# Patient Record
Sex: FEMALE | Race: ASIAN | Employment: FULL TIME | URBAN - METROPOLITAN AREA
[De-identification: names, ages, dates, MRNs, and addresses within clinical notes are randomized per-mention and may not be internally consistent; named-entity substitution may affect disease eponyms.]

---

## 2017-05-31 ENCOUNTER — HOSPITAL ENCOUNTER (EMERGENCY)
Facility: HOSPITAL | Age: 27
Discharge: HOME | End: 2017-05-31
Attending: EMERGENCY MEDICINE
Payer: COMMERCIAL

## 2017-05-31 VITALS
HEART RATE: 66 BPM | DIASTOLIC BLOOD PRESSURE: 82 MMHG | BODY MASS INDEX: 26.68 KG/M2 | HEIGHT: 62 IN | TEMPERATURE: 98 F | OXYGEN SATURATION: 99 % | RESPIRATION RATE: 16 BRPM | WEIGHT: 145 LBS | SYSTOLIC BLOOD PRESSURE: 121 MMHG

## 2017-05-31 DIAGNOSIS — K59.00 CONSTIPATION, UNSPECIFIED CONSTIPATION TYPE: Primary | ICD-10-CM

## 2017-05-31 DIAGNOSIS — R10.9 ABDOMINAL PAIN OF UNKNOWN ETIOLOGY: ICD-10-CM

## 2017-05-31 DIAGNOSIS — N39.0 URINARY TRACT INFECTION WITHOUT HEMATURIA, SITE UNSPECIFIED: ICD-10-CM

## 2017-05-31 PROCEDURE — 99283 EMERGENCY DEPT VISIT LOW MDM: CPT

## 2017-05-31 PROCEDURE — 87088 URINE BACTERIA CULTURE: CPT | Performed by: EMERGENCY MEDICINE

## 2017-05-31 PROCEDURE — 81001 URINALYSIS AUTO W/SCOPE: CPT | Performed by: EMERGENCY MEDICINE

## 2017-05-31 PROCEDURE — 87086 URINE CULTURE/COLONY COUNT: CPT | Performed by: EMERGENCY MEDICINE

## 2017-05-31 PROCEDURE — 99285 EMERGENCY DEPT VISIT HI MDM: CPT

## 2017-05-31 PROCEDURE — 87186 SC STD MICRODIL/AGAR DIL: CPT | Performed by: EMERGENCY MEDICINE

## 2017-05-31 PROCEDURE — 81025 URINE PREGNANCY TEST: CPT

## 2017-05-31 RX ORDER — POLYETHYLENE GLYCOL 3350 17 G/17G
17 POWDER, FOR SOLUTION ORAL DAILY PRN
Qty: 30 EACH | Refills: 0 | Status: SHIPPED | OUTPATIENT
Start: 2017-05-31 | End: 2017-06-07

## 2017-05-31 RX ORDER — MAGNESIUM CARB/ALUMINUM HYDROX 105-160MG
296 TABLET,CHEWABLE ORAL ONCE
Status: COMPLETED | OUTPATIENT
Start: 2017-05-31 | End: 2017-05-31

## 2017-05-31 RX ORDER — SULFAMETHOXAZOLE AND TRIMETHOPRIM 800; 160 MG/1; MG/1
1 TABLET ORAL 2 TIMES DAILY
Qty: 10 TABLET | Refills: 0 | Status: SHIPPED | OUTPATIENT
Start: 2017-05-31 | End: 2017-06-05

## 2017-06-01 NOTE — ED PROVIDER NOTES
Patient Seen in: [facility] Emergency Department    History   Patient presents with:  Constipation (gastrointestinal)    Stated Complaint: UTI    HPI  This is a 24-year-old female who presents with complaints of constipation for last 5 days.   She ha distress. The patient is in no respiratory distress. The patient is not septic or toxic    HEENT: Atraumatic, conjunctiva are not pale. There is no icterus. Oral mucosa Is wet. No facial trauma. The neck is supple.     LUNGS: Clear to auscultation, the The patient feels comfortable going home I reexamined her abdomen soft and nontender without rebound, guarding    I discussed with the patient that were seeing them  in a short period of time.   I discussed with them that there is always a possibility that

## 2018-08-03 ENCOUNTER — HOSPITAL ENCOUNTER (OUTPATIENT)
Dept: GENERAL RADIOLOGY | Facility: HOSPITAL | Age: 28
Discharge: HOME | End: 2018-08-03
Attending: SPECIALIST
Payer: COMMERCIAL

## 2018-08-03 DIAGNOSIS — R76.11 POSITIVE PPD: ICD-10-CM

## 2018-08-03 PROCEDURE — 71046 X-RAY EXAM CHEST 2 VIEWS: CPT | Performed by: SPECIALIST

## 2020-06-04 ENCOUNTER — HOSPITAL ENCOUNTER (EMERGENCY)
Facility: HOSPITAL | Age: 30
Discharge: HOME | End: 2020-06-04
Attending: EMERGENCY MEDICINE
Payer: COMMERCIAL

## 2020-06-04 VITALS
RESPIRATION RATE: 16 BRPM | TEMPERATURE: 101 F | BODY MASS INDEX: 26.5 KG/M2 | SYSTOLIC BLOOD PRESSURE: 122 MMHG | OXYGEN SATURATION: 100 % | HEART RATE: 88 BPM | WEIGHT: 144 LBS | DIASTOLIC BLOOD PRESSURE: 76 MMHG | HEIGHT: 62 IN

## 2020-06-04 DIAGNOSIS — H60.502 ACUTE OTITIS EXTERNA OF LEFT EAR, UNSPECIFIED TYPE: Primary | ICD-10-CM

## 2020-06-04 PROCEDURE — 99283 EMERGENCY DEPT VISIT LOW MDM: CPT

## 2020-06-04 RX ORDER — CIPROFLOXACIN AND DEXAMETHASONE 3; 1 MG/ML; MG/ML
4 SUSPENSION/ DROPS AURICULAR (OTIC) 2 TIMES DAILY
Qty: 1 BOTTLE | Refills: 0 | Status: SHIPPED | OUTPATIENT
Start: 2020-06-04 | End: 2020-06-11

## 2020-06-04 RX ORDER — ACETAMINOPHEN 500 MG
500 TABLET ORAL EVERY 4 HOURS PRN
COMMUNITY

## 2020-06-04 RX ORDER — ACETAMINOPHEN 500 MG
1000 TABLET ORAL ONCE
Status: COMPLETED | OUTPATIENT
Start: 2020-06-04 | End: 2020-06-04

## 2020-06-04 RX ORDER — CIPROFLOXACIN AND DEXAMETHASONE 3; 1 MG/ML; MG/ML
4 SUSPENSION/ DROPS AURICULAR (OTIC) 2 TIMES DAILY
Status: DISCONTINUED | OUTPATIENT
Start: 2020-06-04 | End: 2020-06-04

## 2020-06-04 NOTE — ED PROVIDER NOTES
Patient Seen in: [facility] Emergency Department      History   Patient presents with:  Ear Problem Pain    Stated Complaint: pt states she has fluid coming out of left ear    HPI    Patient is a 30-year-old woman with sharp constant left ear pain. Labs Reviewed - No data to display              MDM     There is no mastoiditis. Patient had a ear wick placed in the left EAC. She was given lidocaine otic drops for pain. Also Tylenol. We also administered Ciprodex otic into the left ear.   She will

## 2020-06-04 NOTE — ED INITIAL ASSESSMENT (HPI)
Pt here with L ear pain since yesterday with fluid draining out of ear. Pt taking Tylenol q4 hours without relief. Pt is 12 weeks pregnant.

## 2020-09-08 ENCOUNTER — HOSPITAL ENCOUNTER (OUTPATIENT)
Facility: HOSPITAL | Age: 30
Setting detail: OBSERVATION
Discharge: HOME | End: 2020-09-08
Attending: OBSTETRICS & GYNECOLOGY | Admitting: OBSTETRICS & GYNECOLOGY
Payer: COMMERCIAL

## 2020-09-08 ENCOUNTER — ULTRASOUND ENCOUNTER (OUTPATIENT)
Dept: PERINATAL CARE | Facility: HOSPITAL | Age: 30
End: 2020-09-08
Attending: OBSTETRICS & GYNECOLOGY
Payer: COMMERCIAL

## 2020-09-08 DIAGNOSIS — O46.93 PREGNANCY WITH THIRD TRIMESTER BLEEDING: ICD-10-CM

## 2020-09-08 DIAGNOSIS — O46.93 PREGNANCY WITH THIRD TRIMESTER BLEEDING: Primary | ICD-10-CM

## 2020-09-08 PROBLEM — Z34.90 PREGNANCY: Status: ACTIVE | Noted: 2020-09-08

## 2020-09-08 PROBLEM — Z34.90 PREGNANCY (HCC): Status: ACTIVE | Noted: 2020-09-08

## 2020-09-08 PROCEDURE — 76815 OB US LIMITED FETUS(S): CPT

## 2020-09-08 PROCEDURE — 76817 TRANSVAGINAL US OBSTETRIC: CPT | Performed by: OBSTETRICS & GYNECOLOGY

## 2020-09-08 PROCEDURE — 99214 OFFICE O/P EST MOD 30 MIN: CPT

## 2020-09-08 RX ORDER — CHOLECALCIFEROL (VITAMIN D3) 25 MCG
1 TABLET,CHEWABLE ORAL DAILY
COMMUNITY

## 2020-09-08 NOTE — PROGRESS NOTES
Pt is a 31year old female admitted to [facility]. Patient presents with:  Vaginal Bleeding: had a little drop of red blood in the toilet this am, and a smear on panty liner upon arrival     Pt is  Unknown intra-uterine pregnancy. History obtained.

## 2020-09-08 NOTE — IMAGING NOTE
Encounter for transvaginal cervical length only no consultation. Single IUP in cephalic presentation. Placenta is posterior. Cardiac activity is present at 153 bpm   MENDEZ is  12.8 cm.  MVP is 3.6 cm   Transvaginal Ultrasound: Cervix is closed, long and n

## 2020-09-08 NOTE — NST
Nonstress Test   Patient: Geeta Florence    Gestation: Unknown    NST:       Variability: Moderate           Accelerations: No           Decelerations: None            Baseline: 150 BPM           Uterine Irritability: No           Contractions: Not p

## 2020-09-25 ENCOUNTER — TELEPHONE (OUTPATIENT)
Dept: PERINATAL CARE | Facility: HOSPITAL | Age: 30
End: 2020-09-25

## 2020-09-29 NOTE — PROGRESS NOTES
Outpatient Maternal-Fetal Medicine Consultation    Dear Dr. Sharath Fan,    Thank you for requesting ultrasound evaluation and maternal fetal medicine consultation on your patient Brant Daniels.   As you are aware she is a 31year old female with a [place] bpm  EFW 1401 g ( 3 lb 1 oz); 37%. MENDEZ is  19.4 cm. MVP is 7 cm     See the imaging tab for the complete ultrasound report. Celia Jauregui DISCUSSION  During her visit we discussed and reviewed the following issues:   We discussed that the fetal head circumference is Amniocentesis or cordocentesis can be performed for viral culture but negative results cannot formally exclude intrauterine infection. Allow six weeks from maternal infection before doing these procedures.          Ultrasound markers suggestive of CMV in

## 2020-10-06 ENCOUNTER — ULTRASOUND ENCOUNTER (OUTPATIENT)
Dept: PERINATAL CARE | Facility: HOSPITAL | Age: 30
End: 2020-10-06
Attending: OBSTETRICS & GYNECOLOGY
Payer: COMMERCIAL

## 2020-10-06 VITALS
BODY MASS INDEX: 30.36 KG/M2 | HEART RATE: 92 BPM | DIASTOLIC BLOOD PRESSURE: 69 MMHG | SYSTOLIC BLOOD PRESSURE: 127 MMHG | WEIGHT: 165 LBS | HEIGHT: 62 IN

## 2020-10-06 DIAGNOSIS — O35.9XX0 SUSPECTED FETAL ABNORMALITY AFFECTING MANAGEMENT OF MOTHER, SINGLE OR UNSPECIFIED FETUS: ICD-10-CM

## 2020-10-06 DIAGNOSIS — O35.9XX0 SUSPECTED FETAL ABNORMALITY AFFECTING MANAGEMENT OF MOTHER, SINGLE OR UNSPECIFIED FETUS: Primary | ICD-10-CM

## 2020-10-06 DIAGNOSIS — O35.0XX0 FETAL MICROCEPHALY AFFECTING ANTEPARTUM CARE OF MOTHER, SINGLE OR UNSPECIFIED FETUS: ICD-10-CM

## 2020-10-06 PROBLEM — O35.07X0 FETAL MICROCEPHALY AFFECTING ANTEPARTUM CARE OF MOTHER: Status: ACTIVE | Noted: 2020-10-06

## 2020-10-06 PROBLEM — O35.07X0: Status: ACTIVE | Noted: 2020-10-06

## 2020-10-06 PROCEDURE — 76811 OB US DETAILED SNGL FETUS: CPT | Performed by: OBSTETRICS & GYNECOLOGY

## 2020-10-06 PROCEDURE — 99205 OFFICE O/P NEW HI 60 MIN: CPT | Performed by: OBSTETRICS & GYNECOLOGY

## 2020-10-06 RX ORDER — MELATONIN
325
COMMUNITY

## 2020-10-06 RX ORDER — MAG HYDROX/ALUMINUM HYD/SIMETH 400-400-40
5000 SUSPENSION, ORAL (FINAL DOSE FORM) ORAL DAILY
COMMUNITY

## 2020-10-12 ENCOUNTER — TELEPHONE (OUTPATIENT)
Dept: PERINATAL CARE | Facility: HOSPITAL | Age: 30
End: 2020-10-12

## 2020-10-12 NOTE — TELEPHONE ENCOUNTER
Toxo IgM  (-)          IgG  (-)    CMV IgM  (-)          IgG  (+)    Likely past exposure to CMV. No follow up recommended.

## 2020-10-19 ENCOUNTER — HOSPITAL ENCOUNTER (EMERGENCY)
Facility: HOSPITAL | Age: 30
Discharge: HOME | End: 2020-10-20
Attending: EMERGENCY MEDICINE
Payer: COMMERCIAL

## 2020-10-19 ENCOUNTER — APPOINTMENT (OUTPATIENT)
Dept: LAB | Age: 30
End: 2020-10-19
Attending: SPECIALIST
Payer: COMMERCIAL

## 2020-10-19 DIAGNOSIS — J06.9 ACUTE UPPER RESPIRATORY INFECTION: ICD-10-CM

## 2020-10-19 DIAGNOSIS — Z20.822 SUSPECTED COVID-19 VIRUS INFECTION: Primary | ICD-10-CM

## 2020-10-19 DIAGNOSIS — J06.9 VIRAL UPPER RESPIRATORY TRACT INFECTION: ICD-10-CM

## 2020-10-19 DIAGNOSIS — Z34.90 PREGNANCY, UNSPECIFIED GESTATIONAL AGE: ICD-10-CM

## 2020-10-19 PROCEDURE — 87086 URINE CULTURE/COLONY COUNT: CPT | Performed by: EMERGENCY MEDICINE

## 2020-10-19 PROCEDURE — 80053 COMPREHEN METABOLIC PANEL: CPT | Performed by: EMERGENCY MEDICINE

## 2020-10-19 PROCEDURE — 99284 EMERGENCY DEPT VISIT MOD MDM: CPT

## 2020-10-19 PROCEDURE — 96360 HYDRATION IV INFUSION INIT: CPT

## 2020-10-19 PROCEDURE — 85025 COMPLETE CBC W/AUTO DIFF WBC: CPT | Performed by: EMERGENCY MEDICINE

## 2020-10-19 PROCEDURE — 81001 URINALYSIS AUTO W/SCOPE: CPT | Performed by: EMERGENCY MEDICINE

## 2020-10-20 ENCOUNTER — APPOINTMENT (OUTPATIENT)
Dept: GENERAL RADIOLOGY | Facility: HOSPITAL | Age: 30
End: 2020-10-20
Attending: EMERGENCY MEDICINE
Payer: COMMERCIAL

## 2020-10-20 VITALS
SYSTOLIC BLOOD PRESSURE: 116 MMHG | RESPIRATION RATE: 18 BRPM | DIASTOLIC BLOOD PRESSURE: 64 MMHG | HEIGHT: 62 IN | TEMPERATURE: 98 F | HEART RATE: 102 BPM | BODY MASS INDEX: 29.81 KG/M2 | OXYGEN SATURATION: 98 % | WEIGHT: 162 LBS

## 2020-10-20 PROCEDURE — 71045 X-RAY EXAM CHEST 1 VIEW: CPT | Performed by: EMERGENCY MEDICINE

## 2020-10-20 RX ORDER — AMOXICILLIN 500 MG/1
500 TABLET, FILM COATED ORAL 3 TIMES DAILY
Qty: 30 TABLET | Refills: 0 | Status: SHIPPED | OUTPATIENT
Start: 2020-10-20 | End: 2020-10-30

## 2020-10-20 NOTE — ED PROVIDER NOTES
Patient Seen in: [facility] Emergency Department      History   Patient presents with:  Fever    Stated Complaint: 32 wks preg, fever    HPI    32-week pregnant female presents to ED given fever for 6 days.   She states her temp was 103 today, took T reactive to light. Neck:      Musculoskeletal: Normal range of motion and neck supple. Cardiovascular:      Rate and Rhythm: Normal rate and regular rhythm. Pulses: Normal pulses. Heart sounds: Normal heart sounds.    Pulmonary:      Effort: P Narrative: The following orders were created for panel order CBC WITH DIFFERENTIAL WITH PLATELET.   Procedure                               Abnormality         Status                     ---------                               -----------         ------ antibiotic. L&D nurse came and monitored fetal heart rate. Discussed the patient to take vitamin D, zinc for prophylaxis. Discussed with her to have close follow-up with her OB and PCP over the next few days.   Also discussed with her to return to ED if

## 2020-10-20 NOTE — ED INITIAL ASSESSMENT (HPI)
A/O X 4. Patient is 32 weeks pregnant. Today presents with fever x 6 days. Patient reports Tmax today 103.0.   Last tylenol was at 10pm. Patient denies any shortness of breath, dysuria, or any other symptoms

## 2020-10-22 NOTE — PROGRESS NOTES
Sukumar Gilbert  Dear Dr. Jennifer Mcadams,     Thank you for requesting ultrasound evaluation and maternal fetal medicine consultation on your patient Geeta Ford.   As you are aware she is a 31year old female  with a review. We discussed the stable interval growth which is reassuring. She had viral titers drawn by her OB that were negative. We reviewed the signs and symptoms of preeclampsia,  labor and monitoring fetal movement.   We discussed reasons for her to evaluate for fetal growth. Patients can have a normal vaginal delivery and  should be reserved for usual obstetric indications. Isolation protocols advised by the CDC will be enforced in delivery units and postpartum.   Women who have the virus

## 2020-10-24 ENCOUNTER — HOSPITAL ENCOUNTER (EMERGENCY)
Facility: HOSPITAL | Age: 30
Discharge: HOME | End: 2020-10-25
Attending: EMERGENCY MEDICINE
Payer: COMMERCIAL

## 2020-10-24 ENCOUNTER — APPOINTMENT (OUTPATIENT)
Dept: GENERAL RADIOLOGY | Facility: HOSPITAL | Age: 30
End: 2020-10-24
Attending: EMERGENCY MEDICINE
Payer: COMMERCIAL

## 2020-10-24 VITALS
OXYGEN SATURATION: 98 % | BODY MASS INDEX: 29.81 KG/M2 | HEART RATE: 100 BPM | WEIGHT: 162 LBS | SYSTOLIC BLOOD PRESSURE: 108 MMHG | HEIGHT: 62 IN | RESPIRATION RATE: 25 BRPM | TEMPERATURE: 99 F | DIASTOLIC BLOOD PRESSURE: 69 MMHG

## 2020-10-24 DIAGNOSIS — U07.1 PNEUMONIA DUE TO COVID-19 VIRUS: Primary | ICD-10-CM

## 2020-10-24 DIAGNOSIS — J12.82 PNEUMONIA DUE TO COVID-19 VIRUS: Primary | ICD-10-CM

## 2020-10-24 PROCEDURE — 81001 URINALYSIS AUTO W/SCOPE: CPT | Performed by: EMERGENCY MEDICINE

## 2020-10-24 PROCEDURE — 99285 EMERGENCY DEPT VISIT HI MDM: CPT

## 2020-10-24 PROCEDURE — 93010 ELECTROCARDIOGRAM REPORT: CPT

## 2020-10-24 PROCEDURE — 87086 URINE CULTURE/COLONY COUNT: CPT | Performed by: EMERGENCY MEDICINE

## 2020-10-24 PROCEDURE — 99284 EMERGENCY DEPT VISIT MOD MDM: CPT

## 2020-10-24 PROCEDURE — 80053 COMPREHEN METABOLIC PANEL: CPT | Performed by: EMERGENCY MEDICINE

## 2020-10-24 PROCEDURE — 93005 ELECTROCARDIOGRAM TRACING: CPT

## 2020-10-24 PROCEDURE — 85025 COMPLETE CBC W/AUTO DIFF WBC: CPT | Performed by: EMERGENCY MEDICINE

## 2020-10-24 PROCEDURE — 84484 ASSAY OF TROPONIN QUANT: CPT | Performed by: EMERGENCY MEDICINE

## 2020-10-24 PROCEDURE — 71045 X-RAY EXAM CHEST 1 VIEW: CPT | Performed by: EMERGENCY MEDICINE

## 2020-10-24 PROCEDURE — 36415 COLL VENOUS BLD VENIPUNCTURE: CPT

## 2020-10-25 ENCOUNTER — HOSPITAL ENCOUNTER (OUTPATIENT)
Facility: HOSPITAL | Age: 30
Setting detail: OBSERVATION
Discharge: HOME | End: 2020-10-25
Attending: STUDENT IN AN ORGANIZED HEALTH CARE EDUCATION/TRAINING PROGRAM | Admitting: STUDENT IN AN ORGANIZED HEALTH CARE EDUCATION/TRAINING PROGRAM
Payer: COMMERCIAL

## 2020-10-25 VITALS
RESPIRATION RATE: 22 BRPM | DIASTOLIC BLOOD PRESSURE: 68 MMHG | SYSTOLIC BLOOD PRESSURE: 109 MMHG | TEMPERATURE: 99 F | WEIGHT: 162 LBS | BODY MASS INDEX: 29.81 KG/M2 | OXYGEN SATURATION: 99 % | HEART RATE: 88 BPM | HEIGHT: 62 IN

## 2020-10-25 PROCEDURE — 99214 OFFICE O/P EST MOD 30 MIN: CPT

## 2020-10-25 PROCEDURE — 59025 FETAL NON-STRESS TEST: CPT

## 2020-10-25 RX ORDER — BUDESONIDE AND FORMOTEROL FUMARATE DIHYDRATE 160; 4.5 UG/1; UG/1
2 AEROSOL RESPIRATORY (INHALATION) 2 TIMES DAILY
COMMUNITY

## 2020-10-25 NOTE — NST
Nonstress Test   Patient: Wayne Campos    Gestation: 33w0d    NST:   Variability: Moderate           Accelerations: Yes           Decelerations: None            Baseline: 145 BPM           Uterine Irritability: No           Contractions: Not presen

## 2020-10-25 NOTE — ED NOTES
Spoke to [facility] charge Cuco Lawton in regards to pregnancy. Told to page patients OBGYN. Waiting for ER MD to arrive to contact. RN aware.

## 2020-10-25 NOTE — ED INITIAL ASSESSMENT (HPI)
Pt reporst she was tested positive for covid on Monday has 1 more dose of azithromycin to take. Presents with fever and shortness of breath. Pt reports she took tylenol 1 hour ago, temperature upon arrival 98.6 orally. O2 sat 99% on room air.  Pt also repor

## 2020-10-25 NOTE — ED NOTES
Pt ambulated to the bathroom, O2 sat 99%. No change in status regarding respirations or breathing after ambulation. Pt reports having some difficulty breathing but reports no difference in her breathing status after walking to the bathroom and back.

## 2020-10-25 NOTE — ED NOTES
Pt cleared from ER, pt to be discharged to [facility] for follow up with her abdominal pain.

## 2020-10-25 NOTE — ED NOTES
Per OB charge, FHT assessment is all that is required for pt if ER would like something more for pt then OB needs to be called. Charge RN notified, ER MD notified.

## 2020-10-25 NOTE — PROGRESS NOTES
Pt  [facility] presents to L&D directly from ED. Pt is Coivd + (10/19) and  was seen in the ED for SOB, left sided and chest pain and right-sided abd pain with movement.  Pt denies any uterine activity, leaking of fluid or vag bleeding at this time, p

## 2020-10-25 NOTE — ED PROVIDER NOTES
Patient Seen in: [facility] Emergency Department      History   Patient presents with:  Difficulty Breathing  Fever    Stated Complaint: SOB; Fever TMax - 102.0; COVID + on 10/19/20. Pt is 33 wks pregnant.  A0.  *    HPI    Patient is 30-year-ol 73.5 kg   LMP 03/08/2020   SpO2 98%   BMI 29.63 kg/m²         Physical Exam  Vitals signs and nursing note reviewed. Constitutional:       General: She is not in acute distress. Appearance: She is well-developed. She is not toxic-appearing.    HENT: other components within normal limits   CBC W/ DIFFERENTIAL - Abnormal; Notable for the following components:    RBC 3.43 (*)     HGB 11.0 (*)     HCT 31.5 (*)     All other components within normal limits   TROPONIN I - Normal   CBC WITH DIFFERENTIAL WITH Normal for age.                                     =====    CONCLUSION:  Mixed response to therapy compared to the study of     10/20/2020, worsening disease in the lung bases slight improvement in the     right suprahilar and mid lung and left perihilar List

## 2020-10-25 NOTE — PROGRESS NOTES
D/C instructions given to pt ans discussed. Pt states no questions at this time, verb understanding and agreeable to POC. Pt escorted out via wheelchair by this RN with instructions in hand in stable condition.

## 2020-10-29 ENCOUNTER — TELEPHONE (OUTPATIENT)
Dept: PERINATAL CARE | Facility: HOSPITAL | Age: 30
End: 2020-10-29

## 2020-11-03 ENCOUNTER — ULTRASOUND ENCOUNTER (OUTPATIENT)
Dept: PERINATAL CARE | Facility: HOSPITAL | Age: 30
End: 2020-11-03
Attending: OBSTETRICS & GYNECOLOGY
Payer: COMMERCIAL

## 2020-11-03 VITALS
WEIGHT: 158 LBS | DIASTOLIC BLOOD PRESSURE: 80 MMHG | BODY MASS INDEX: 29 KG/M2 | HEART RATE: 83 BPM | SYSTOLIC BLOOD PRESSURE: 121 MMHG

## 2020-11-03 DIAGNOSIS — O35.9XX0 SUSPECTED FETAL ABNORMALITY AFFECTING MANAGEMENT OF MOTHER, SINGLE OR UNSPECIFIED FETUS: ICD-10-CM

## 2020-11-03 DIAGNOSIS — O46.93 PREGNANCY WITH THIRD TRIMESTER BLEEDING: ICD-10-CM

## 2020-11-03 DIAGNOSIS — O98.513 COVID-19 AFFECTING PREGNANCY IN THIRD TRIMESTER: ICD-10-CM

## 2020-11-03 DIAGNOSIS — U07.1 COVID-19 AFFECTING PREGNANCY IN THIRD TRIMESTER: ICD-10-CM

## 2020-11-03 DIAGNOSIS — O35.0XX0 FETAL MICROCEPHALY AFFECTING ANTEPARTUM CARE OF MOTHER, SINGLE OR UNSPECIFIED FETUS: ICD-10-CM

## 2020-11-03 DIAGNOSIS — O35.9XX0 SUSPECTED FETAL ABNORMALITY AFFECTING MANAGEMENT OF MOTHER, SINGLE OR UNSPECIFIED FETUS: Primary | ICD-10-CM

## 2020-11-03 PROCEDURE — 99215 OFFICE O/P EST HI 40 MIN: CPT | Performed by: OBSTETRICS & GYNECOLOGY

## 2020-11-03 PROCEDURE — 76816 OB US FOLLOW-UP PER FETUS: CPT | Performed by: OBSTETRICS & GYNECOLOGY

## 2020-11-03 PROCEDURE — 76819 FETAL BIOPHYS PROFIL W/O NST: CPT | Performed by: OBSTETRICS & GYNECOLOGY

## 2020-11-03 PROCEDURE — 76819 FETAL BIOPHYS PROFIL W/O NST: CPT

## 2020-12-07 ENCOUNTER — TELEPHONE (OUTPATIENT)
Dept: OBGYN UNIT | Facility: HOSPITAL | Age: 30
End: 2020-12-07

## 2020-12-08 ENCOUNTER — HOSPITAL ENCOUNTER (INPATIENT)
Facility: HOSPITAL | Age: 30
LOS: 3 days | Discharge: HOME | End: 2020-12-11
Attending: OBSTETRICS & GYNECOLOGY | Admitting: OBSTETRICS & GYNECOLOGY
Payer: COMMERCIAL

## 2020-12-08 ENCOUNTER — APPOINTMENT (OUTPATIENT)
Dept: OBGYN CLINIC | Facility: HOSPITAL | Age: 30
End: 2020-12-08
Payer: COMMERCIAL

## 2020-12-08 PROCEDURE — 3E0P7VZ INTRODUCTION OF HORMONE INTO FEMALE REPRODUCTIVE, VIA NATURAL OR ARTIFICIAL OPENING: ICD-10-PCS | Performed by: STUDENT IN AN ORGANIZED HEALTH CARE EDUCATION/TRAINING PROGRAM

## 2020-12-08 PROCEDURE — 86901 BLOOD TYPING SEROLOGIC RH(D): CPT | Performed by: OBSTETRICS & GYNECOLOGY

## 2020-12-08 PROCEDURE — 86780 TREPONEMA PALLIDUM: CPT | Performed by: OBSTETRICS & GYNECOLOGY

## 2020-12-08 PROCEDURE — 85025 COMPLETE CBC W/AUTO DIFF WBC: CPT | Performed by: OBSTETRICS & GYNECOLOGY

## 2020-12-08 PROCEDURE — 86850 RBC ANTIBODY SCREEN: CPT | Performed by: OBSTETRICS & GYNECOLOGY

## 2020-12-08 PROCEDURE — 86900 BLOOD TYPING SEROLOGIC ABO: CPT | Performed by: OBSTETRICS & GYNECOLOGY

## 2020-12-08 RX ORDER — ONDANSETRON 2 MG/ML
4 INJECTION INTRAMUSCULAR; INTRAVENOUS EVERY 6 HOURS PRN
Status: DISCONTINUED | OUTPATIENT
Start: 2020-12-08 | End: 2020-12-09 | Stop reason: HOSPADM

## 2020-12-08 RX ORDER — DEXTROSE, SODIUM CHLORIDE, SODIUM LACTATE, POTASSIUM CHLORIDE, AND CALCIUM CHLORIDE 5; .6; .31; .03; .02 G/100ML; G/100ML; G/100ML; G/100ML; G/100ML
INJECTION, SOLUTION INTRAVENOUS AS NEEDED
Status: DISCONTINUED | OUTPATIENT
Start: 2020-12-08 | End: 2020-12-09 | Stop reason: HOSPADM

## 2020-12-08 RX ORDER — AMMONIA INHALANTS 0.04 G/.3ML
0.3 INHALANT RESPIRATORY (INHALATION) AS NEEDED
Status: DISCONTINUED | OUTPATIENT
Start: 2020-12-08 | End: 2020-12-09 | Stop reason: HOSPADM

## 2020-12-08 RX ORDER — ACETAMINOPHEN 500 MG
500 TABLET ORAL EVERY 6 HOURS PRN
Status: DISCONTINUED | OUTPATIENT
Start: 2020-12-08 | End: 2020-12-09 | Stop reason: HOSPADM

## 2020-12-08 RX ORDER — SODIUM CHLORIDE, SODIUM LACTATE, POTASSIUM CHLORIDE, CALCIUM CHLORIDE 600; 310; 30; 20 MG/100ML; MG/100ML; MG/100ML; MG/100ML
INJECTION, SOLUTION INTRAVENOUS CONTINUOUS
Status: DISCONTINUED | OUTPATIENT
Start: 2020-12-08 | End: 2020-12-09 | Stop reason: HOSPADM

## 2020-12-08 RX ORDER — IBUPROFEN 600 MG/1
600 TABLET ORAL EVERY 6 HOURS PRN
Status: DISCONTINUED | OUTPATIENT
Start: 2020-12-08 | End: 2020-12-09 | Stop reason: HOSPADM

## 2020-12-08 RX ORDER — TERBUTALINE SULFATE 1 MG/ML
0.25 INJECTION, SOLUTION SUBCUTANEOUS AS NEEDED
Status: DISCONTINUED | OUTPATIENT
Start: 2020-12-08 | End: 2020-12-09 | Stop reason: HOSPADM

## 2020-12-08 RX ORDER — TRISODIUM CITRATE DIHYDRATE AND CITRIC ACID MONOHYDRATE 500; 334 MG/5ML; MG/5ML
30 SOLUTION ORAL AS NEEDED
Status: DISCONTINUED | OUTPATIENT
Start: 2020-12-08 | End: 2020-12-09 | Stop reason: HOSPADM

## 2020-12-09 ENCOUNTER — ANESTHESIA EVENT (OUTPATIENT)
Dept: OBGYN UNIT | Facility: HOSPITAL | Age: 30
End: 2020-12-09
Payer: COMMERCIAL

## 2020-12-09 ENCOUNTER — TELEPHONE (OUTPATIENT)
Dept: OBGYN UNIT | Facility: HOSPITAL | Age: 30
End: 2020-12-09

## 2020-12-09 ENCOUNTER — ANESTHESIA (OUTPATIENT)
Dept: OBGYN UNIT | Facility: HOSPITAL | Age: 30
End: 2020-12-09
Payer: COMMERCIAL

## 2020-12-09 PROCEDURE — 0UQC7ZZ REPAIR CERVIX, VIA NATURAL OR ARTIFICIAL OPENING: ICD-10-PCS | Performed by: STUDENT IN AN ORGANIZED HEALTH CARE EDUCATION/TRAINING PROGRAM

## 2020-12-09 PROCEDURE — 0KQM0ZZ REPAIR PERINEUM MUSCLE, OPEN APPROACH: ICD-10-PCS | Performed by: STUDENT IN AN ORGANIZED HEALTH CARE EDUCATION/TRAINING PROGRAM

## 2020-12-09 PROCEDURE — 10907ZC DRAINAGE OF AMNIOTIC FLUID, THERAPEUTIC FROM PRODUCTS OF CONCEPTION, VIA NATURAL OR ARTIFICIAL OPENING: ICD-10-PCS | Performed by: STUDENT IN AN ORGANIZED HEALTH CARE EDUCATION/TRAINING PROGRAM

## 2020-12-09 PROCEDURE — 88307 TISSUE EXAM BY PATHOLOGIST: CPT | Performed by: STUDENT IN AN ORGANIZED HEALTH CARE EDUCATION/TRAINING PROGRAM

## 2020-12-09 PROCEDURE — 3E033VJ INTRODUCTION OF OTHER HORMONE INTO PERIPHERAL VEIN, PERCUTANEOUS APPROACH: ICD-10-PCS | Performed by: STUDENT IN AN ORGANIZED HEALTH CARE EDUCATION/TRAINING PROGRAM

## 2020-12-09 RX ORDER — IBUPROFEN 600 MG/1
600 TABLET ORAL EVERY 6 HOURS
Status: DISCONTINUED | OUTPATIENT
Start: 2020-12-09 | End: 2020-12-11

## 2020-12-09 RX ORDER — DIPHENHYDRAMINE HYDROCHLORIDE 50 MG/ML
12.5 INJECTION INTRAMUSCULAR; INTRAVENOUS EVERY 4 HOURS PRN
Status: DISCONTINUED | OUTPATIENT
Start: 2020-12-09 | End: 2020-12-09

## 2020-12-09 RX ORDER — SIMETHICONE 80 MG
80 TABLET,CHEWABLE ORAL 3 TIMES DAILY PRN
Status: DISCONTINUED | OUTPATIENT
Start: 2020-12-09 | End: 2020-12-11

## 2020-12-09 RX ORDER — BUPIVACAINE HCL/0.9 % NACL/PF 0.25 %
5 PLASTIC BAG, INJECTION (ML) EPIDURAL AS NEEDED
Status: DISCONTINUED | OUTPATIENT
Start: 2020-12-09 | End: 2020-12-09

## 2020-12-09 RX ORDER — DOCUSATE SODIUM 100 MG/1
100 CAPSULE, LIQUID FILLED ORAL
Status: DISCONTINUED | OUTPATIENT
Start: 2020-12-09 | End: 2020-12-11

## 2020-12-09 RX ORDER — BISACODYL 10 MG
10 SUPPOSITORY, RECTAL RECTAL ONCE AS NEEDED
Status: DISCONTINUED | OUTPATIENT
Start: 2020-12-09 | End: 2020-12-11

## 2020-12-09 RX ORDER — LIDOCAINE HYDROCHLORIDE AND EPINEPHRINE 15; 5 MG/ML; UG/ML
INJECTION, SOLUTION EPIDURAL AS NEEDED
Status: DISCONTINUED | OUTPATIENT
Start: 2020-12-09 | End: 2020-12-09 | Stop reason: SURG

## 2020-12-09 RX ORDER — ACETAMINOPHEN 325 MG/1
650 TABLET ORAL EVERY 6 HOURS PRN
Status: DISCONTINUED | OUTPATIENT
Start: 2020-12-09 | End: 2020-12-11

## 2020-12-09 RX ADMIN — LIDOCAINE HYDROCHLORIDE AND EPINEPHRINE 3 ML: 15; 5 INJECTION, SOLUTION EPIDURAL at 08:09:00

## 2020-12-09 NOTE — ANESTHESIA PROCEDURE NOTES
Labor Analgesia  Performed by: Ryan William MD  Authorized by: Ryan William MD       General Information and Staff    Start Time:  12/9/2020 8:02 AM  End Time:  12/9/2020 8:09 AM  Anesthesiologist:  Ryan William MD  Performed by:   Anesthesiologist  Patient

## 2020-12-09 NOTE — PROGRESS NOTES
Patient up to bathroom with assist x 2. Unable to void at this time. Patient transferred to mother/baby room [phone] per wheelchair in stable condition with baby and personal belongings. Accompanied by significant other and staff.   Report given to mother/bab

## 2020-12-09 NOTE — PLAN OF CARE
Problem: SAFETY ADULT - FALL  Goal: Free from fall injury  Description: INTERVENTIONS:  - Assess pt frequently for physical needs  - Identify cognitive and physical deficits and behaviors that affect risk of falls.   - Arthur fall precautions as indica

## 2020-12-09 NOTE — H&P
[facility]  Obstetrics Admission History & Physical    Aimee Mcintosh Patient Status:  Inpatient    1990 MRN [id number]   Location [facility] Attending Maude Runner, MD, MD   Hosp Day # 1 PCP Sergey Summers MD the pregnancy. Category 2 tracing with periods of minimal variability and FHR decelerations was noted overnight after placement of cytotec. She received terbutaline x1 dose. The tracing improved.  She continued to have irregular contractions overnight wi Problem Relation Age of Onset   • Hypertension Mother    • Hypertension Father    • Diabetes Father    • Other (hypercholesterolemia) Father    • Asthma Sister         GYNECOLOGICAL HISTORY:        Menarche 13yo. Menses every 28 days, lasting 4 days.   Hi Intrauterine pregnancy, 39w3d. 2.  COVID-19 infection in third trimester of pregnancy  3.   Elevated glucose tolerance test with normal 3-hour GTT  4.  Small fetal head measurements with appropriate interval growth and normal EFW, negative Toxoplasmosis, p

## 2020-12-09 NOTE — PLAN OF CARE
Problem: SAFETY ADULT - FALL  Goal: Free from fall injury  Description: INTERVENTIONS:  - Assess pt frequently for physical needs  - Identify cognitive and physical deficits and behaviors that affect risk of falls.   - Victor fall precautions as indica

## 2020-12-09 NOTE — PLAN OF CARE
Problem: BIRTH - VAGINAL/ SECTION  Goal: Fetal and maternal status remain reassuring during the birth process  Description: INTERVENTIONS:  - Monitor vital signs  - Monitor fetal heart rate  - Monitor uterine activity  - Monitor labor progression less  Multidisciplinary care   Nonpharmacologic comfort measures       Outcome: Progressing

## 2020-12-09 NOTE — ANESTHESIA PREPROCEDURE EVALUATION
PRE-OP EVALUATION    Patient Name: Danielle Escobar    Pre-op Diagnosis: * No pre-op diagnosis entered *    * No procedures listed *    * No surgeons found in log *    Pre-op vitals reviewed.   Temp: 98.4 °F (36.9 °C)  Pulse: 78  Resp: 18  BP: 124/71 by mouth daily. , Disp: , Rfl: , 12/7/2020 at Unknown time    •  Budesonide-Formoterol Fumarate 160-4.5 MCG/ACT Inhalation Aerosol, Inhale 2 puffs into the lungs 2 (two) times daily. , Disp: , Rfl: , More than a month at Unknown time    •  acetaminophen 500 normal     Dental    No notable dental history. Pulmonary    Pulmonary exam normal.  Breath sounds clear to auscultation bilaterally.                Other findings            ASA: 2   Plan: epidural     Patient has not taken beta blockers in last 24

## 2020-12-09 NOTE — PROGRESS NOTES
Pt is a , 39.2 weeks, presents to L&D for an elective Cytotec IOL. Pt states +FM. Denies any vaginal bleeding or LOF. EFM tested & applied. Consents explained & signed. Hx obtained. POC rev'd with pt & pt verbalized understanding.

## 2020-12-09 NOTE — L&D DELIVERY NOTE
Jeannette Tom [[id number]]    Labor Events     labor?: No   steroids?: None  Cervical ripening type: Misoprostol  Antibiotics received during labor?: No  Antibiotics (enter # doses in comment): none  Rupture date/time: 2020 0635     R bpm    Reflex irritability No response Grimace Cry or active withdrawal    Muscle tone Limp Some flexion Active motion    Respiratory effort Absent Weak cry; hypoventilation Good, crying              1 Minute:  5 Minute:  10 Minute:  15 Minute:  20 Minute: decelerations developed. Terbutaline was given IM x1. She continued to have irregular contractions overnight. AROM was performed at 0636 with pink tinged fluid. Cervical exam was 3/80/-2. Pitocin was started.  Epidural was placed per patient request. Her la

## 2020-12-10 PROCEDURE — 85025 COMPLETE CBC W/AUTO DIFF WBC: CPT | Performed by: STUDENT IN AN ORGANIZED HEALTH CARE EDUCATION/TRAINING PROGRAM

## 2020-12-10 NOTE — PROGRESS NOTES
[facility] Vaginal Delivery Progress Note    Thomas Salas Patient Status:  Inpatient    1990 MRN [id number]   [facility] 2SW-J Attending Shanae Bains MD, MD   Hosp Day # 2 PCP Christiano Patino MD     SUBJECTI

## 2020-12-10 NOTE — PROGRESS NOTES
Labor Analgesia Follow Up Note    Patient underwent epidural anesthesia for labor analgesia,    Placenta Date/Time: 12/9/2020  1:02 PM    Delivery Date/Time[de-identified] 12/9/2020  12:59 PM    /69 (BP Location: Right arm)   Pulse 59   Temp 97.6 °F (36.4 °C) (Or

## 2020-12-11 VITALS
BODY MASS INDEX: 28.71 KG/M2 | WEIGHT: 158 LBS | SYSTOLIC BLOOD PRESSURE: 111 MMHG | TEMPERATURE: 98 F | HEIGHT: 62.01 IN | DIASTOLIC BLOOD PRESSURE: 57 MMHG | HEART RATE: 58 BPM | RESPIRATION RATE: 18 BRPM

## 2020-12-11 PROBLEM — O35.07X0: Status: RESOLVED | Noted: 2020-10-06 | Resolved: 2020-12-11

## 2020-12-11 PROBLEM — Z34.90 PREGNANCY (HCC): Status: RESOLVED | Noted: 2020-09-08 | Resolved: 2020-12-11

## 2020-12-11 PROBLEM — U07.1 COVID-19 AFFECTING PREGNANCY IN THIRD TRIMESTER (HCC): Status: RESOLVED | Noted: 2020-11-03 | Resolved: 2020-12-11

## 2020-12-11 PROBLEM — O98.513 COVID-19 AFFECTING PREGNANCY IN THIRD TRIMESTER (HCC): Status: RESOLVED | Noted: 2020-11-03 | Resolved: 2020-12-11

## 2020-12-11 PROBLEM — O98.513 COVID-19 AFFECTING PREGNANCY IN THIRD TRIMESTER: Status: RESOLVED | Noted: 2020-11-03 | Resolved: 2020-12-11

## 2020-12-11 PROBLEM — O35.0XX0 FETAL MICROCEPHALY AFFECTING ANTEPARTUM CARE OF MOTHER: Status: RESOLVED | Noted: 2020-10-06 | Resolved: 2020-12-11

## 2020-12-11 PROBLEM — Z34.90 PREGNANCY: Status: RESOLVED | Noted: 2020-09-08 | Resolved: 2020-12-11

## 2020-12-11 PROBLEM — O35.07X0 FETAL MICROCEPHALY AFFECTING ANTEPARTUM CARE OF MOTHER: Status: RESOLVED | Noted: 2020-10-06 | Resolved: 2020-12-11

## 2020-12-11 PROBLEM — U07.1 COVID-19 AFFECTING PREGNANCY IN THIRD TRIMESTER: Status: RESOLVED | Noted: 2020-11-03 | Resolved: 2020-12-11

## 2020-12-11 NOTE — PROGRESS NOTES
[facility] Vaginal Delivery Progress Note    Vida Shruthi Patient Status:  Inpatient    1990 MRN [id number]   [facility]SW[facility] Attending Karen Larry MD, MD   Hosp Day # 3 PCP Lucio Palmer MD     SUBJECTI

## 2020-12-14 ENCOUNTER — TELEPHONE (OUTPATIENT)
Dept: OBGYN UNIT | Facility: HOSPITAL | Age: 30
End: 2020-12-14

## 2020-12-15 ENCOUNTER — TELEPHONE (OUTPATIENT)
Dept: OBGYN UNIT | Facility: HOSPITAL | Age: 30
End: 2020-12-15

## 2021-11-29 DIAGNOSIS — M25.511 ACUTE PAIN OF RIGHT SHOULDER: Primary | ICD-10-CM

## 2023-04-26 ENCOUNTER — OFFICE VISIT (OUTPATIENT)
Dept: OCCUPATIONAL MEDICINE | Age: 33
End: 2023-04-26
Attending: FAMILY MEDICINE

## 2023-04-26 DIAGNOSIS — Z02.89 VISIT FOR OCCUPATIONAL HEALTH EXAMINATION: Primary | ICD-10-CM

## 2023-04-26 LAB
HBV SURFACE AB SER QL: REACTIVE
HBV SURFACE AB SERPL IA-ACNC: 743.37 MIU/ML

## 2023-04-26 PROCEDURE — 86480 TB TEST CELL IMMUN MEASURE: CPT

## 2023-04-26 PROCEDURE — 86706 HEP B SURFACE ANTIBODY: CPT

## 2023-04-28 LAB
M TB IFN-G CD4+ T-CELLS BLD-ACNC: 0.09 IU/ML
M TB TUBERC IFN-G BLD QL: NEGATIVE
M TB TUBERC IGNF/MITOGEN IGNF CONTROL: >10 IU/ML
QFT TB1 AG MINUS NIL: 0 IU/ML
QFT TB2 AG MINUS NIL: 0.02 IU/ML

## 2024-12-02 ENCOUNTER — ULTRASOUND ENCOUNTER (OUTPATIENT)
Facility: CLINIC | Age: 34
End: 2024-12-02
Payer: COMMERCIAL

## 2024-12-11 ENCOUNTER — TELEPHONE (OUTPATIENT)
Facility: CLINIC | Age: 34
End: 2024-12-11

## 2024-12-11 ENCOUNTER — INITIAL PRENATAL (OUTPATIENT)
Facility: CLINIC | Age: 34
End: 2024-12-11
Payer: COMMERCIAL

## 2024-12-11 VITALS
WEIGHT: 182.19 LBS | HEART RATE: 77 BPM | DIASTOLIC BLOOD PRESSURE: 68 MMHG | SYSTOLIC BLOOD PRESSURE: 124 MMHG | BODY MASS INDEX: 33.1 KG/M2 | HEIGHT: 62.01 IN

## 2024-12-11 DIAGNOSIS — O99.210 OBESITY AFFECTING PREGNANCY, ANTEPARTUM, UNSPECIFIED OBESITY TYPE (HCC): ICD-10-CM

## 2024-12-11 DIAGNOSIS — Z83.3 FAMILY HISTORY OF DIABETES MELLITUS IN FATHER: ICD-10-CM

## 2024-12-11 DIAGNOSIS — Z86.79 HISTORY OF CHRONIC HYPERTENSION: ICD-10-CM

## 2024-12-11 DIAGNOSIS — O09.32 INITIAL OBSTETRIC VISIT IN SECOND TRIMESTER (HCC): Primary | ICD-10-CM

## 2024-12-11 DIAGNOSIS — Z13.79 GENETIC SCREENING: ICD-10-CM

## 2024-12-11 LAB
APPEARANCE: CLEAR
BILIRUBIN: NEGATIVE
CREAT UR-SCNC: 44.4 MG/DL
GLUCOSE (URINE DIPSTICK): NEGATIVE MG/DL
KETONES (URINE DIPSTICK): NEGATIVE MG/DL
LEUKOCYTES: NEGATIVE
MULTISTIX LOT#: NORMAL NUMERIC
NITRITE, URINE: NEGATIVE
OCCULT BLOOD: NEGATIVE
PH, URINE: 7 (ref 4.5–8)
PROT UR-MCNC: <6 MG/DL (ref ?–14)
PROTEIN (URINE DIPSTICK): NEGATIVE MG/DL
SPECIFIC GRAVITY: 1.01 (ref 1–1.03)
URINE-COLOR: YELLOW
UROBILINOGEN,SEMI-QN: 0.2 MG/DL (ref 0–1.9)

## 2024-12-11 PROCEDURE — 87591 N.GONORRHOEAE DNA AMP PROB: CPT

## 2024-12-11 PROCEDURE — 87086 URINE CULTURE/COLONY COUNT: CPT

## 2024-12-11 PROCEDURE — 3008F BODY MASS INDEX DOCD: CPT

## 2024-12-11 PROCEDURE — 3074F SYST BP LT 130 MM HG: CPT

## 2024-12-11 PROCEDURE — 81002 URINALYSIS NONAUTO W/O SCOPE: CPT

## 2024-12-11 PROCEDURE — 87491 CHLMYD TRACH DNA AMP PROBE: CPT

## 2024-12-11 PROCEDURE — 82570 ASSAY OF URINE CREATININE: CPT

## 2024-12-11 PROCEDURE — 3078F DIAST BP <80 MM HG: CPT

## 2024-12-11 PROCEDURE — 84156 ASSAY OF PROTEIN URINE: CPT

## 2024-12-11 NOTE — PROGRESS NOTES
Initial OB - 14w5d         OBhx -  , per pt no complications with delivery or pregnancy  PMHx - . Denies blood transfusion, HIV, hepatitis, HSV, VTE, or congenital heart defects   Psurghx - D&C in   Last pap 2024, per pt is normal     34 year old , EDC by 6w5d US  -had first OB appointment with Herib[facility]oy, then was told her PCP needed to refer her to OB/GYN in network.   -Carrier  discussed and drawn today    AMA   -NIPS vs amino discussed - wants NIPS, drawn today  -ASA discussed - 2 tabs of 81 mg recommended  -L2US, growths, and NSTs per MFM recommendations     Obesity (Pre pregnancy BMI 32)  -see  surveillance as above   -early 1 hr GTT added to PNL     H/o Chronic HTN   -nadolol by PCP - had to stop taking, would become hypotensive. Is not currently on medication.   -baseline CMP, P/C ratio ordered   -advise to check bp at home and keep log, bring log to next visit.       ADHD   -was on Adderall -has stopped taking when found out she was pregnant     Flu vaccine received at PCP     Fm Hx DM  -her father is a diabetic- takes metformin  -A1c added to PNL     RTC 4 wk, next appt with OBs

## 2024-12-11 NOTE — TELEPHONE ENCOUNTER
14  [place] pregnancy      NIPS and Carrier screen lab draw per NOHEMI Mccallum    Specimen tubes name/ labeled verified with patient.  Specimen placed in  to place order.  Discussed to call office in 1 and 2 weeks for results, result/gender information will be sent in her [facility] portal.   Patient verbalized understanding, agreed to and intend to comply with plan of care.

## 2024-12-12 LAB
C TRACH DNA SPEC QL NAA+PROBE: NEGATIVE
N GONORRHOEA DNA SPEC QL NAA+PROBE: NEGATIVE

## 2025-01-09 ENCOUNTER — LAB ENCOUNTER (OUTPATIENT)
Dept: LAB | Age: 35
End: 2025-01-09
Payer: COMMERCIAL

## 2025-01-09 DIAGNOSIS — O99.210 OBESITY AFFECTING PREGNANCY, ANTEPARTUM, UNSPECIFIED OBESITY TYPE (HCC): ICD-10-CM

## 2025-01-09 DIAGNOSIS — Z86.79 HISTORY OF CHRONIC HYPERTENSION: ICD-10-CM

## 2025-01-09 DIAGNOSIS — O09.32 INITIAL OBSTETRIC VISIT IN SECOND TRIMESTER (HCC): ICD-10-CM

## 2025-01-09 LAB
ALBUMIN SERPL-MCNC: 3.9 G/DL (ref 3.2–4.8)
ALBUMIN/GLOB SERPL: 1.4 {RATIO} (ref 1–2)
ALP LIVER SERPL-CCNC: 60 U/L
ALT SERPL-CCNC: 16 U/L
ANION GAP SERPL CALC-SCNC: 13 MMOL/L (ref 0–18)
ANTIBODY SCREEN: NEGATIVE
AST SERPL-CCNC: 14 U/L (ref ?–34)
BASOPHILS # BLD AUTO: 0.03 X10(3) UL (ref 0–0.2)
BASOPHILS NFR BLD AUTO: 0.3 %
BILIRUB SERPL-MCNC: 0.5 MG/DL (ref 0.3–1.2)
BUN BLD-MCNC: 7 MG/DL (ref 9–23)
CALCIUM BLD-MCNC: 9.3 MG/DL (ref 8.7–10.4)
CHLORIDE SERPL-SCNC: 103 MMOL/L (ref 98–112)
CO2 SERPL-SCNC: 21 MMOL/L (ref 21–32)
CREAT BLD-MCNC: 0.61 MG/DL
DEPRECATED HBV CORE AB SER IA-ACNC: 95 NG/ML
EGFRCR SERPLBLD CKD-EPI 2021: 119 ML/MIN/1.73M2 (ref 60–?)
EOSINOPHIL # BLD AUTO: 0.09 X10(3) UL (ref 0–0.7)
EOSINOPHIL NFR BLD AUTO: 1 %
ERYTHROCYTE [DISTWIDTH] IN BLOOD BY AUTOMATED COUNT: 12.8 %
EST. AVERAGE GLUCOSE BLD GHB EST-MCNC: 88 MG/DL (ref 68–126)
FASTING STATUS PATIENT QL REPORTED: YES
GLOBULIN PLAS-MCNC: 2.8 G/DL (ref 2–3.5)
GLUCOSE 1H P GLC SERPL-MCNC: 134 MG/DL
GLUCOSE BLD-MCNC: 129 MG/DL (ref 70–99)
HBA1C MFR BLD: 4.7 % (ref ?–5.7)
HBV SURFACE AG SER-ACNC: <0.1 [IU]/L
HBV SURFACE AG SERPL QL IA: NONREACTIVE
HCT VFR BLD AUTO: 35 %
HCV AB SERPL QL IA: NONREACTIVE
HGB BLD-MCNC: 11.9 G/DL
IMM GRANULOCYTES # BLD AUTO: 0.08 X10(3) UL (ref 0–1)
IMM GRANULOCYTES NFR BLD: 0.9 %
LYMPHOCYTES # BLD AUTO: 1.75 X10(3) UL (ref 1–4)
LYMPHOCYTES NFR BLD AUTO: 19.4 %
MCH RBC QN AUTO: 32.6 PG (ref 26–34)
MCHC RBC AUTO-ENTMCNC: 34 G/DL (ref 31–37)
MCV RBC AUTO: 95.9 FL
MONOCYTES # BLD AUTO: 0.57 X10(3) UL (ref 0.1–1)
MONOCYTES NFR BLD AUTO: 6.3 %
NEUTROPHILS # BLD AUTO: 6.48 X10 (3) UL (ref 1.5–7.7)
NEUTROPHILS # BLD AUTO: 6.48 X10(3) UL (ref 1.5–7.7)
NEUTROPHILS NFR BLD AUTO: 72.1 %
OSMOLALITY SERPL CALC.SUM OF ELEC: 284 MOSM/KG (ref 275–295)
PLATELET # BLD AUTO: 294 10(3)UL (ref 150–450)
POTASSIUM SERPL-SCNC: 3.4 MMOL/L (ref 3.5–5.1)
PROT SERPL-MCNC: 6.7 G/DL (ref 5.7–8.2)
RBC # BLD AUTO: 3.65 X10(6)UL
RH BLOOD TYPE: POSITIVE
RUBV IGG SER QL: POSITIVE
RUBV IGG SER-ACNC: 226 IU/ML (ref 10–?)
SODIUM SERPL-SCNC: 137 MMOL/L (ref 136–145)
T PALLIDUM AB SER QL IA: NONREACTIVE
WBC # BLD AUTO: 9 X10(3) UL (ref 4–11)

## 2025-01-09 PROCEDURE — 36415 COLL VENOUS BLD VENIPUNCTURE: CPT

## 2025-01-09 PROCEDURE — 87340 HEPATITIS B SURFACE AG IA: CPT

## 2025-01-09 PROCEDURE — 86850 RBC ANTIBODY SCREEN: CPT

## 2025-01-09 PROCEDURE — 86803 HEPATITIS C AB TEST: CPT

## 2025-01-09 PROCEDURE — 86780 TREPONEMA PALLIDUM: CPT

## 2025-01-09 PROCEDURE — 82950 GLUCOSE TEST: CPT

## 2025-01-09 PROCEDURE — 80053 COMPREHEN METABOLIC PANEL: CPT

## 2025-01-09 PROCEDURE — 87389 HIV-1 AG W/HIV-1&-2 AB AG IA: CPT

## 2025-01-09 PROCEDURE — 86901 BLOOD TYPING SEROLOGIC RH(D): CPT

## 2025-01-09 PROCEDURE — 85025 COMPLETE CBC W/AUTO DIFF WBC: CPT

## 2025-01-09 PROCEDURE — 83036 HEMOGLOBIN GLYCOSYLATED A1C: CPT

## 2025-01-09 PROCEDURE — 86762 RUBELLA ANTIBODY: CPT

## 2025-01-09 PROCEDURE — 86900 BLOOD TYPING SEROLOGIC ABO: CPT

## 2025-01-09 PROCEDURE — 82728 ASSAY OF FERRITIN: CPT

## 2025-01-10 ENCOUNTER — ROUTINE PRENATAL (OUTPATIENT)
Facility: CLINIC | Age: 35
End: 2025-01-10
Payer: COMMERCIAL

## 2025-01-10 VITALS
DIASTOLIC BLOOD PRESSURE: 72 MMHG | WEIGHT: 180.81 LBS | HEIGHT: 62.01 IN | SYSTOLIC BLOOD PRESSURE: 120 MMHG | BODY MASS INDEX: 32.85 KG/M2 | HEART RATE: 73 BPM

## 2025-01-10 DIAGNOSIS — O09.529 ANTEPARTUM MULTIGRAVIDA OF ADVANCED MATERNAL AGE (HCC): ICD-10-CM

## 2025-01-10 DIAGNOSIS — Z86.79 HISTORY OF CHRONIC HYPERTENSION: ICD-10-CM

## 2025-01-10 DIAGNOSIS — O99.210 OBESITY AFFECTING PREGNANCY, ANTEPARTUM, UNSPECIFIED OBESITY TYPE (HCC): ICD-10-CM

## 2025-01-10 DIAGNOSIS — Z34.90 PRENATAL CARE, ANTEPARTUM (HCC): Primary | ICD-10-CM

## 2025-01-10 NOTE — PROGRESS NOTES
ABDULLAHI - 19w0d     Pt doing well. Had some leg cramps at night - potassium was slightly low on labs, advised to increase intake  Starting to feel fetal movement      34 year old , EDC by 6w5d US  -had first OB appointment with Miri, then was told her PCP needed to refer her to OB/GYN in network.   -NIPT neg  -carrier screen positive for CF, declined partner testing  Flu vaccine received at PCP     AMA   -NIPS vs amino discussed - wants NIPS, neg  -ASA discussed - 2 tabs of 81 mg recommended - will take  -L2US, growths, and NSTs per MFM recommendations - ordered referral    Obesity (Pre pregnancy BMI 32)  -see  surveillance as above   -early 1 hr GTT added to PNL -normal    H/o Chronic HTN   -nadolol by PCP - had to stop taking, would become hypotensive. Is not currently on medication.   -baseline CMP, P/C ratio ordered - normal  -advise to check bp at home and keep log, bring log to next visit.       ADHD   -was on Adderall -has stopped taking when found out she was pregnant      Fm Hx DM  -her father is a diabetic- takes metformin  -A1c added to PNL - normal, 4.7

## 2025-01-10 NOTE — PATIENT INSTRUCTIONS
To schedule your appointment for your 20 week ultrasound call:    [facility] - Monica Guzman [place], [place]   [facility]  [facility])  [address] [place] [address], [address] [phone]

## 2025-02-03 ENCOUNTER — OFFICE VISIT (OUTPATIENT)
Dept: PERINATAL CARE | Facility: HOSPITAL | Age: 35
End: 2025-02-03
Attending: STUDENT IN AN ORGANIZED HEALTH CARE EDUCATION/TRAINING PROGRAM
Payer: COMMERCIAL

## 2025-02-03 ENCOUNTER — ULTRASOUND ENCOUNTER (OUTPATIENT)
Dept: PERINATAL CARE | Facility: HOSPITAL | Age: 35
End: 2025-02-03
Attending: OBSTETRICS & GYNECOLOGY
Payer: COMMERCIAL

## 2025-02-03 VITALS
BODY MASS INDEX: 33.31 KG/M2 | WEIGHT: 181 LBS | HEIGHT: 62 IN | SYSTOLIC BLOOD PRESSURE: 117 MMHG | DIASTOLIC BLOOD PRESSURE: 68 MMHG | HEART RATE: 83 BPM

## 2025-02-03 DIAGNOSIS — Z14.1 CYSTIC FIBROSIS CARRIER: ICD-10-CM

## 2025-02-03 DIAGNOSIS — Z86.79 HISTORY OF CHRONIC HYPERTENSION: ICD-10-CM

## 2025-02-03 DIAGNOSIS — O09.529 AMA (ADVANCED MATERNAL AGE) MULTIGRAVIDA 35+ (HCC): ICD-10-CM

## 2025-02-03 DIAGNOSIS — O99.210 OBESITY AFFECTING PREGNANCY, ANTEPARTUM (HCC): ICD-10-CM

## 2025-02-03 DIAGNOSIS — O09.522 MULTIGRAVIDA OF ADVANCED MATERNAL AGE IN SECOND TRIMESTER (HCC): ICD-10-CM

## 2025-02-03 DIAGNOSIS — O09.522 MULTIGRAVIDA OF ADVANCED MATERNAL AGE IN SECOND TRIMESTER (HCC): Primary | ICD-10-CM

## 2025-02-03 DIAGNOSIS — O09.529 ANTEPARTUM MULTIGRAVIDA OF ADVANCED MATERNAL AGE (HCC): ICD-10-CM

## 2025-02-03 PROCEDURE — 76811 OB US DETAILED SNGL FETUS: CPT | Performed by: OBSTETRICS & GYNECOLOGY

## 2025-02-03 RX ORDER — ASPIRIN 81 MG/1
162 TABLET ORAL DAILY
COMMUNITY

## 2025-02-03 NOTE — PROGRESS NOTES
Reason for Consult:   Dear Dr. Martell,    Thank you for requesting ultrasound evaluation and maternal fetal medicine consultation on Santos Quesada.  As you are aware she is a 34 year old female with a Beasley pregnancy .  A maternal-fetal medicine consultation was requested secondary to  AMA.  Her prenatal records and labs were reviewed.    Review of History:     OB History:    OB History    Para Term  AB Living   3 1 1 0 1 1   SAB IAB Ectopic Multiple Live Births   1 0 0 0 1      # Outcome Date GA Lbr Jerad/2nd Weight Sex Type Anes PTL Lv   3 Current            2 SAB  14w6d       FD   1 Term 20 39w3d 05:00 / 01:24 5 lb 15.2 oz (2.7 kg) M NORMAL SPONT EPI N WINSTON      Complications: Variable decelerations      Name: ASHLEY QUESADA      Apgar1: 8  Apgar5: 9             Allergies:  Allergies[1]   Current Meds:  Current Outpatient Medications   Medication Sig Dispense Refill    aspirin 81 MG Oral Tab EC Take 2 tablets (162 mg total) by mouth daily.      Budesonide-Formoterol Fumarate 160-4.5 MCG/ACT Inhalation Aerosol Inhale 2 puffs into the lungs 2 (two) times daily.      prenatal multivitamin plus DHA 27-0.8-228 MG Oral Cap Take 1 capsule by mouth daily.      cholecalciferol (VITAMIN D3) 125 MCG (5000 UT) Oral Cap Take 5,000 Units by mouth daily. (Patient not taking: Reported on 2024)      ferrous sulfate 325 (65 FE) MG Oral Tab EC Take 325 mg by mouth daily with breakfast. (Patient not taking: Reported on 2024)      acetaminophen 500 MG Oral Tab Take 1 tablet (500 mg total) by mouth every 4 (four) hours as needed for Pain.          HISTORY:  Past Medical History:    ADHD    Anemia    Asthma (HCC)    Cervical high risk HPV (human papillomavirus) test positive    CMV (cytomegalovirus) antibody positive    avidity test high therefore not a recent infection (not within the past 4 months)    History of lumpectomy of left breast    Pap smear for cervical cancer screening     Negative, HPV+    Screening for genetic disease carrier status    Carrier Screen = CARRIER for Cystic Fibrosis      Past Surgical History:   Procedure Laterality Date    Appendectomy  1994    Breast surgery      D & c      Other Left 2006    breast cyst removal      Family History   Problem Relation Age of Onset    Hypertension Mother     Hypertension Father     Diabetes Father     Other (hypercholesterolemia) Father     Asthma Sister       Social History     Socioeconomic History    Marital status: Single   Tobacco Use    Smoking status: Former     Current packs/day: 0.00     Types: Cigarettes    Smokeless tobacco: Never   Vaping Use    Vaping status: Never Used   Substance and Sexual Activity    Alcohol use: Not Currently    Drug use: Never   Other Topics Concern    Caffeine Concern No    Exercise No    Seat Belt No    Special Diet No    Stress Concern No    Weight Concern No     Social Drivers of Health     Financial Resource Strain: Low Risk  (1/9/2025)    Financial Resource Strain     Difficulty of Paying Living Expenses: Not very hard     Med Affordability: No   Food Insecurity: No Food Insecurity (1/9/2025)    Food Insecurity     Food Insecurity: Never true   Transportation Needs: No Transportation Needs (1/9/2025)    Transportation Needs     Lack of Transportation: No   Stress: No Stress Concern Present (1/9/2025)    Stress     Feeling of Stress : No   Housing Stability: Low Risk  (1/9/2025)    Housing Stability     Housing Instability: No        NARRATIVE:     /68 (BP Location: Right arm, Patient Position: Sitting, Cuff Size: adult)   Pulse 83   Ht 5' 2\" (1.575 m)   Wt 181 lb (82.1 kg)   LMP 08/19/2024   BMI 33.11 kg/m²           Alert and Oriented.  No acute distress          Abdomen:  soft, nontender, no contractions noted.           extremities:  nontender, no edema       DISCUSSION  During her visit we discussed and reviewed the following issues:  ADVANCED MATERNAL AGE    Background  I  reviewed with the patient that pregnancies in women of advanced maternal age (35 or older at delivery) are associated with elevated risks. Specifically, there is a higher rate of:  Fetal malformations  Preeclampsia  Gestational diabetes  Intrauterine fetal death    As a result, enhanced pregnancy surveillance is advised for these patients including a comprehensive ultrasound to assess for fetal malformations (at 20 weeks) and a third trimester ultrasound assessment for fetal growth (at 32 weeks). In addition, weekly NST's (initiating at 36 weeks gestation for women 35-39 years and at 32 weeks gestation for women 40 years and older) are also advised. Routine obstetric care is more than adequate to assess for gestational diabetes and preeclampsia; hence, no further significant alterations in obstetric care are advised.    Medical Complications    Women 35 years of age or older can expect to experience two to three fold higher rates of hospitalization,  delivery, and pregnancy-related complications when compared to their younger counterparts.  The two most common medical problems complicating these  pregnanccies are hypertension and diabetes.   The incidence of preeclampsia in the general obstetric population is 3 to 4 percent; this increases to 5 to 10 percent in women over age 40 and is as high as 35 percent in women over age 50.   The incidence of gestational diabetes in the general obstetric population is 3 percent, rising to 7 to 12 percent in women over age 40 and 20 percent in women over age 50.  Women 35 years of age or older are more likely to be delivered by . The  delivery rate in the general obstetric population of the [place] is almost 30 percent, compared to almost 50 percent in women over age 40 to 45 and almost 80 percent in women age 50 to 63.          Fetal Death        A decision analysis tool using data from the [place] [employer]  [employer] predicted a strategy  of weekly antepartum testing and labor induction would lower the risk of unexplained fetal death in women 35 years of age or older. In this model, weekly testing starting at 36 weeks of gestation would drop the risk of fetal death from 5.2 to 1.3 per 1000 pregnancies. While a policy of antepartum testing in older women does increase the chance that a women will be induced (71 inductions per fetal death averted) and thereby increases her risk of having a  delivery, only 14 additional cesareans would need to be performed to avert one unexplained fetal death.  Hence, weekly NST's are advised for women of advanced maternal age; testing should be initiated at 36 weeks for women 35-39 years and at 32 weeks for women 40 years and older.    Fetal Malformations    Cardiac malformations, clubfoot, and diaphragmatic hernia appear to occur with increased frequency in offspring of older women. These abnormalities are structural and unrelated to aneuploidy, thus they would not be detected by karyotype analysis.  For these reasons a complete, detailed ultrasound (level II) is advised even if the fetus has a normal karyotype.      Fetal Aneuploidy      Invasive Testing  I offered invasive genetic testing (amniocentesis, chorionic villus sampling) after reviewing the diagnostic accuracy of these tests as well as the procedure associated loss rate (1:500 for genetic amniocentesis).        We discussed  the increased risk of chromosomal abnormalities associated with advanced maternal age at age 35 year old. She understands that ultrasound exam cannot exclude potential genetic abnormalities.  Her estimated risk based on maternal age at amniocentesis with any chromosome abnormality is about 1:140  and with Down Syndrome is about 1:250 .   We also discussed the risks and benefits of having  genetic testing ([facility] and amniocentesis) performed.      Non-invasive Pregnancy Testing (NIPT)  I reviewed current non-invasive screening  options. Currently non-invasive pregnancy testing (NIPT) offers the highest detection rate (with the lowest false positive rate) for the detection of fetal aneuploidy amongst high-risk patients. The limitations of detailed mid-trimester sonography was reviewed with the patient. First trimester screening and second trimester multiple-marker serum serum screening as alternative aneuploidy screening options were also reviewed. However, both of these tests are associated with lower detection and higher false positive rates.    -------------    Cystic fibrosis (CF) is a chronic pulmonary and exocrine pancreatic disease. It occurs most commonly among Caucasians of [place]  heritage with a carrier rate of / to 1/ people in this heritage. Abnormal sweat chlride levels, chronic lung disease, pancreatic insufficiency, liver disease and obstructive azoospermia in males are the markers in its classic form.   The gene casuing CF has been identified in the long arm of chromosome 7. There are hundreds of mutations for CF and the most common is [id number] in the Caucasians of Northern  heritage. It is an autosomal recessive disorder which has i/4 chance of inheriting the disease when both parents are carriers or heterozygous for CF.  fetal testing is available via [facility], amniocentesis or fetal blood. We discussed about CF and the rational for  fetal testing.     FOB carrier status unknown      OB ULTRASOUND REPORT   See imaging tab for complete ultrasound report or in PACS    Single IUP in cephalic presentation.    Placenta is posterior.   A 3 vessel cord is noted.  Cardiac activity is present at 163 bpm   g ( 1 lb 1 oz);   MVP is 5.1 cm .         Fetal Anatomy:  Visualized with normal appearance: head, face, spine, neck, skin, chest, abdominal wall, gastrointestinal tract, kidneys, bladder, extremities.   Brain: Visualized and normal appearances: brain parenchyma, cerebral ventricles,  choroid plexus, Cisterna Magna, midline falx, cerebellum, cerebellar lobes, posterior fossa, vermis, cavum septi pellucidi.  Face: eyes normal, profile normal, nose normal, lip normal, palate normal.  Heart: visualized and normal appearance: 3 vessel view, four-chamber, left outflow tract, right outflow tract, arches.      Genetic Sonogram:  Nuchal fold normal.  Pyelectasis absent.  No hyperechogenic bowel.  Echogenic intracardiac foci absent. Nasal bone present. Choroid plexus cyst absent.      Summary of Ultrasound findings:  This is a Beasley pregnancy    The fetal measurements are consistent with established EDC. No gross ultrasound evidence of structural abnormalities are seen today. No minor markers for aneuploidy are seen. The patient understands that ultrasound cannot rule out all structural and chromosomal abnormalities.       IMPRESSION:   1. IUP @  22w3d  2. Scan consistent with dates  3. No fetal structural abnormalities seen  4. AMA  5.  Carrier cystic fibrosis ---FOB carrier status unknown    RECOMMENDATIONS:   1.  Weekly NST at 36wks  2.  Growth US at 32wks  3.  Notify Peds of carrier status of mother    Thank you for allowing me to participate in the care of your patient.  Please do not hesitate to call with any questions or concerns.     Total time spent   40  minutes this calendar day which includes preparing to see the patient including chart review, obtaining and/or reviewing additional medical history, performing a physical exam and evaluation, documenting clinical information in the electronic medical record, independently interpreting results, counseling the patient, communicating results to the patient/family/caregiver and coordinating care.    Gerson Cohen D.O.  Maternal Fetal Medicine     Note to patient and family:  The 21st Century Cures Act makes medical notes available to patients in the interest of transparency.  However, please be advised that this is a medical document.  It is  intended as a peer to peer communication.  It is written in medical language and may contain abbreviations or verbiage that are technical and unfamiliar.  It may appear blunt or direct.  Medical documents are intended to carry relevant information, facts as evident, and the clinical opinion of the practitioner.         [1] No Known Allergies

## 2025-02-07 ENCOUNTER — TELEPHONE (OUTPATIENT)
Facility: CLINIC | Age: 35
End: 2025-02-07

## 2025-02-07 ENCOUNTER — ROUTINE PRENATAL (OUTPATIENT)
Facility: CLINIC | Age: 35
End: 2025-02-07
Payer: COMMERCIAL

## 2025-02-07 VITALS
DIASTOLIC BLOOD PRESSURE: 66 MMHG | HEIGHT: 62 IN | BODY MASS INDEX: 33.64 KG/M2 | HEART RATE: 75 BPM | WEIGHT: 182.81 LBS | SYSTOLIC BLOOD PRESSURE: 112 MMHG

## 2025-02-07 DIAGNOSIS — Z13.0 SCREENING FOR DEFICIENCY ANEMIA: ICD-10-CM

## 2025-02-07 DIAGNOSIS — Z13.1 DIABETES MELLITUS SCREENING: Primary | ICD-10-CM

## 2025-02-07 NOTE — TELEPHONE ENCOUNTER
[place] paperwork was received, $25 payment was processed and forms were emailed/ inner-office mailed to Forms Department.     Pt advised to call Forms Department for any questions or concerns at [phone].

## 2025-02-07 NOTE — PROGRESS NOTES
Scar 23w0d    She is doing well, no complaints   -1 hr GTT & CBC discussed and ordered  to be timed between 24-28 wks      EDC by 6w5d US  -had first OB appointment with [facility], then was told her PCP needed to refer her to OB/GYN in network.   -NIPT neg  -carrier screen positive for CF, declined partner testing  Flu vaccine received at PCP      AMA   -NIPS vs amino discussed - wants NIPS, neg  -ASA discussed - 2 tabs of 81 mg recommended - will take  -L2US - normal   - NSTs at 36 wks  -growth US at 32 wks      Obesity (Pre pregnancy BMI 32)  -see  surveillance as above   -early 1 hr GTT added to PNL -normal     H/o Chronic HTN   -nadolol by PCP - had to stop taking, would become hypotensive. Is not currently on medication.   -baseline CMP, P/C ratio ordered - normal  -advise to check bp at home and keep log, bring log to next visit.         ADHD   -was on Adderall -has stopped taking when found out she was pregnant        Fm Hx DM  -her father is a diabetic- takes metformin  -A1c added to PNL - normal, 4.7

## 2025-02-11 ENCOUNTER — TELEPHONE (OUTPATIENT)
Facility: CLINIC | Age: 35
End: 2025-02-11

## 2025-02-11 NOTE — TELEPHONE ENCOUNTER
Received Family Medical Leave Act forms via email from office, no authorization attached, sending GoBeMe message to obtain Release of Information completion, logged for processing.

## 2025-02-14 NOTE — TELEPHONE ENCOUNTER
Cld patient 2nd time to gather details, no answer Left message to call back again, sending tadoÂ°hart message to encourage call w/ details.

## 2025-03-06 ENCOUNTER — LAB ENCOUNTER (OUTPATIENT)
Dept: LAB | Age: 35
End: 2025-03-06
Payer: COMMERCIAL

## 2025-03-06 DIAGNOSIS — Z13.0 SCREENING FOR DEFICIENCY ANEMIA: ICD-10-CM

## 2025-03-06 DIAGNOSIS — Z13.1 DIABETES MELLITUS SCREENING: ICD-10-CM

## 2025-03-06 LAB
BASOPHILS # BLD AUTO: 0.02 X10(3) UL (ref 0–0.2)
BASOPHILS NFR BLD AUTO: 0.2 %
EOSINOPHIL # BLD AUTO: 0.08 X10(3) UL (ref 0–0.7)
EOSINOPHIL NFR BLD AUTO: 0.9 %
ERYTHROCYTE [DISTWIDTH] IN BLOOD BY AUTOMATED COUNT: 12.9 %
GLUCOSE 1H P GLC SERPL-MCNC: 139 MG/DL
HCT VFR BLD AUTO: 33 %
HGB BLD-MCNC: 11.1 G/DL
IMM GRANULOCYTES # BLD AUTO: 0.15 X10(3) UL (ref 0–1)
IMM GRANULOCYTES NFR BLD: 1.6 %
LYMPHOCYTES # BLD AUTO: 2.08 X10(3) UL (ref 1–4)
LYMPHOCYTES NFR BLD AUTO: 22.5 %
MCH RBC QN AUTO: 32.5 PG (ref 26–34)
MCHC RBC AUTO-ENTMCNC: 33.6 G/DL (ref 31–37)
MCV RBC AUTO: 96.5 FL
MONOCYTES # BLD AUTO: 0.51 X10(3) UL (ref 0.1–1)
MONOCYTES NFR BLD AUTO: 5.5 %
NEUTROPHILS # BLD AUTO: 6.39 X10 (3) UL (ref 1.5–7.7)
NEUTROPHILS # BLD AUTO: 6.39 X10(3) UL (ref 1.5–7.7)
NEUTROPHILS NFR BLD AUTO: 69.3 %
PLATELET # BLD AUTO: 307 10(3)UL (ref 150–450)
RBC # BLD AUTO: 3.42 X10(6)UL
WBC # BLD AUTO: 9.2 X10(3) UL (ref 4–11)

## 2025-03-06 PROCEDURE — 85025 COMPLETE CBC W/AUTO DIFF WBC: CPT

## 2025-03-06 PROCEDURE — 82950 GLUCOSE TEST: CPT

## 2025-03-06 PROCEDURE — 36415 COLL VENOUS BLD VENIPUNCTURE: CPT

## 2025-03-07 ENCOUNTER — ROUTINE PRENATAL (OUTPATIENT)
Facility: CLINIC | Age: 35
End: 2025-03-07
Payer: COMMERCIAL

## 2025-03-07 VITALS
HEART RATE: 91 BPM | DIASTOLIC BLOOD PRESSURE: 66 MMHG | WEIGHT: 186.81 LBS | SYSTOLIC BLOOD PRESSURE: 110 MMHG | BODY MASS INDEX: 34.38 KG/M2 | HEIGHT: 62 IN

## 2025-03-07 DIAGNOSIS — Z3A.27 27 WEEKS GESTATION OF PREGNANCY (HCC): ICD-10-CM

## 2025-03-07 DIAGNOSIS — Z34.82 PRENATAL CARE, SUBSEQUENT PREGNANCY IN SECOND TRIMESTER (HCC): Primary | ICD-10-CM

## 2025-03-07 NOTE — PROGRESS NOTES
27 wks    Patient has no complaints    -HIV, t.pal ordered  - TDAP next visit     EDC by 6w5d US  -had first OB appointment with [facility], then was told her PCP needed to refer her to OB/GYN in network.   -NIPT neg GIRL  -carrier screen positive for CF, declined partner testing  Flu vaccine received at PCP      AMA   -NIPS vs amino discussed - wants NIPS, neg  -ASA taking  -L2US - normal   - NSTs at 36 wks  -growth US at 32 wks      Obesity (Pre pregnancy BMI 32)  -see  surveillance as above   -early 1 hr GTT added to PNL -normal     H/o Chronic HTN   -nadolol by PCP - had to stop taking, would become hypotensive. Is not currently on medication.   -baseline CMP, P/C ratio ordered - normal  -advise to check bp at home and keep log, bring log to next visit.         ADHD   -was on Adderall -has stopped taking when found out she was pregnant        Fm Hx DM  -her father is a diabetic- takes metformin  -A1c added to PNL - normal, 4.7

## 2025-03-12 ENCOUNTER — TELEPHONE (OUTPATIENT)
Facility: CLINIC | Age: 35
End: 2025-03-12

## 2025-03-20 ENCOUNTER — LAB ENCOUNTER (OUTPATIENT)
Dept: LAB | Age: 35
End: 2025-03-20
Attending: OBSTETRICS & GYNECOLOGY
Payer: COMMERCIAL

## 2025-03-20 DIAGNOSIS — Z34.82 PRENATAL CARE, SUBSEQUENT PREGNANCY IN SECOND TRIMESTER (HCC): ICD-10-CM

## 2025-03-20 LAB — T PALLIDUM AB SER QL IA: NONREACTIVE

## 2025-03-20 PROCEDURE — 87389 HIV-1 AG W/HIV-1&-2 AB AG IA: CPT

## 2025-03-20 PROCEDURE — 86780 TREPONEMA PALLIDUM: CPT

## 2025-03-20 PROCEDURE — 36415 COLL VENOUS BLD VENIPUNCTURE: CPT

## 2025-03-21 ENCOUNTER — ROUTINE PRENATAL (OUTPATIENT)
Facility: CLINIC | Age: 35
End: 2025-03-21
Payer: COMMERCIAL

## 2025-03-21 VITALS
BODY MASS INDEX: 34.41 KG/M2 | HEART RATE: 75 BPM | DIASTOLIC BLOOD PRESSURE: 66 MMHG | WEIGHT: 187 LBS | SYSTOLIC BLOOD PRESSURE: 104 MMHG | HEIGHT: 62 IN

## 2025-03-21 DIAGNOSIS — Z23 NEED FOR VACCINATION: ICD-10-CM

## 2025-03-21 DIAGNOSIS — Z34.83 PRENATAL CARE, SUBSEQUENT PREGNANCY IN THIRD TRIMESTER (HCC): Primary | ICD-10-CM

## 2025-03-21 DIAGNOSIS — Z3A.29 29 WEEKS GESTATION OF PREGNANCY (HCC): ICD-10-CM

## 2025-03-21 PROCEDURE — 90471 IMMUNIZATION ADMIN: CPT | Performed by: OBSTETRICS & GYNECOLOGY

## 2025-03-21 PROCEDURE — 90715 TDAP VACCINE 7 YRS/> IM: CPT | Performed by: OBSTETRICS & GYNECOLOGY

## 2025-03-21 NOTE — PROGRESS NOTES
29 wk  Patient has no complaints    -HIV, t.pal done  - TDAP today     EDC by 6w5d US  -had first OB appointment with [facility], then was told her PCP needed to refer her to OB/GYN in network.   -NIPT neg GIRL  -carrier screen positive for CF, declined partner testing  Flu vaccine received at PCP      AMA   -NIPS vs amino discussed - wants NIPS, neg  -ASA taking  -L2US - normal   - NSTs at 36 wks  -growth US at 32 wks      Obesity (Pre pregnancy BMI 32)  -see  surveillance as above   -early 1 hr GTT added to PNL -normal     H/o Chronic HTN   -nadolol by PCP - had to stop taking, would become hypotensive. Is not currently on medication.   -baseline CMP, P/C ratio ordered - normal        ADHD   -was on Adderall -has stopped taking when found out she was pregnant        Fm Hx DM  -her father is a diabetic- takes metformin  -A1c added to PNL - normal, 4.7

## 2025-04-04 ENCOUNTER — ROUTINE PRENATAL (OUTPATIENT)
Facility: CLINIC | Age: 35
End: 2025-04-04
Payer: COMMERCIAL

## 2025-04-04 VITALS
DIASTOLIC BLOOD PRESSURE: 74 MMHG | HEIGHT: 62 IN | WEIGHT: 189 LBS | HEART RATE: 76 BPM | SYSTOLIC BLOOD PRESSURE: 114 MMHG | BODY MASS INDEX: 34.78 KG/M2

## 2025-04-04 DIAGNOSIS — O09.529 SUPERVISION OF HIGH-RISK PREGNANCY OF ELDERLY MULTIGRAVIDA (HCC): Primary | ICD-10-CM

## 2025-04-04 DIAGNOSIS — Z3A.31 31 WEEKS GESTATION OF PREGNANCY (HCC): ICD-10-CM

## 2025-04-04 RX ORDER — POLYETHYLENE GLYCOL 3350 17 G/17G
POWDER, FOR SOLUTION ORAL
COMMUNITY
Start: 2025-03-21

## 2025-04-04 NOTE — PROGRESS NOTES
ABDULLAHI 31.0     Patient has no complaints. Provided work letter for restrictions.   + FM. NO Ucx, VB, LOF.      EDC by 6w5d US  -had first OB appointment with [facility], then was told her PCP needed to refer her to OB/GYN in network.   -NIPT neg GIRL  -carrier screen positive for CF, declined partner testing  Flu vaccine received at PCP   -1hr GTT & CBC: done  -3rd tri HIV & Tpal: done  -Tdap done     AMA   -NIPS vs amino discussed - wants NIPS, neg  -ASA taking  -L2US - normal   - NSTs at 36 wks  -growth US at 32 wks: reminded patient to schedule      Obesity (Pre pregnancy BMI 32)  -see  surveillance as above   -early 1 hr GTT added to PNL -normal     H/o Chronic HTN   -nadolol by PCP - had to stop taking, would become hypotensive. Is not currently on medication.   -baseline CMP, P/C ratio ordered - normal        ADHD   -was on Adderall -has stopped taking when found out she was pregnant        Fm Hx DM  -her father is a diabetic- takes metformin  -A1c added to PNL - normal, 4.7

## 2025-04-04 NOTE — TELEPHONE ENCOUNTER
Patient called to check status on forms, patient said her employer never received the forms. Advised patient forms were placed on hold due to no call back.     Patient provided details.     Type of Leave: continuous fmla  Reason for Leave: pregnancy  Start date of leave: JOCELYN 6/6/25  End date of leave:12 weeks  How many flare ups per month/length?:n  How many appts per month/length?: n  Was Fee and Turnaround info Given?: y

## 2025-04-07 NOTE — TELEPHONE ENCOUNTER
Dr. Martell    Please sign off on form if you agree to: Family Medical Leave Act for Maternity    -Signature page will be the first page scanned  -From your Inbasket, Highlight the patient and click Chart   -Double click the 2/11/25 Forms Completion telephone encounter  -Scroll down to the Media section   -Click the blue Hyperlink: Family Medical Leave Act    Dr. Martell  4/7/25  -Click Acknowledge located in the top right ribbon/menu   -Drag the mouse into the blank space of the document and a + sign will appear. Left click to   electronically sign the document.  -Once signed, simply exit out of the screen and you signature will be saved.     Thank you,    Stephen SMITH

## 2025-04-08 NOTE — TELEPHONE ENCOUNTER
Family Medical Leave Act form completed and signed by provider. Faxed to [facility] - Attn: Trung Majano/ HR  [phone] - Several Attempts to fax - All FAILED  Uploaded to [employer]

## 2025-04-10 NOTE — PROGRESS NOTES
Outpatient Maternal-Fetal Medicine Follow-Up    Dear Dr. Martell    Thank you for requesting an ultrasound and maternal-fetal medicine consultation on your patient Santos Carr.  As you are aware she is a 34 year old female  with a mejia pregnancy and an Estimated Date of Delivery: 25.  She returned to maternal-fetal medicine today for a follow-up visit.  Her history was reviewed from her prior visit and there were no interval changes.    Antepartum Risk Factors  AMA: low-risk cell free fetal DNA, declined invasive testing  CF carrier - FOB carrier status unknown    PHYSICAL EXAMINATION:  /79 (BP Location: Right arm, Patient Position: Sitting, Cuff Size: adult)   Pulse 93   Ht 5' 2\" (1.575 m)   Wt 187 lb (84.8 kg)   LMP 2024   BMI 34.20 kg/m²   General: alert and oriented, no acute distress  Abdomen: gravid, soft, non-tender  Extremities: non-tender, no edema    OBSTETRIC ULTRASOUND  The patient had a follow-up growth ultrasound today which revealed normal interval fetal growth, BPP 8/8.    Ultrasound Findings:  Single IUP in cephalic presentation.    Placenta is posterior.   A 3 vessel cord is noted.  Cardiac activity is present at 182 bpm  EFW 2283 g ( 5 lb 1 oz); 61%.    MENDEZ is  14.3 cm.  MVP is 4.5 cm  BPP is 8/8.     The fetal measurements are consistent with established EDC. No gross ultrasound evidence of structural abnormalities are seen today. The patient understands that ultrasound cannot rule out all structural and chromosomal abnormalities.     See Imaging Tab For Complete Ultrasound Report  I interpreted the results and reviewed them with the patient.    DISCUSSION  During her visit we discussed and reviewed the following issues:  ADVANCED MATERNAL AGE  See prior M notes for a detailed review.  She did not desire invasive genetic testing.   She has already obtained a low-risk NPIT result and was appropriately reassured.    IMPRESSION:  IUP at 32w6d  AMA:  low-risk cell free fetal DNA, declined invasive testing  CF carrier - FOB carrier status unknown    RECOMMENDATIONS:  Continue care with Dr. Martell  Weekly NST's at 36 weeks    Thank you for allowing me to participate in the care of your patient.  Please do not hesitate to contact me if additional questions or concerns arise.      Leonides Anderson M.D.    20 minutes spent in review of records, patient consultation, documentation and coordination of care.  The relevant clinical matter(s) are summarized above.     Note to patient and family  The 21st Century Cures Act makes medical notes available to patients in the interest of transparency.  However, please be advised that this is a medical document.  It is intended as mayv-or-wduf communication.  It is written and medical language may contain abbreviations or verbiage that are technical and unfamiliar.  It may appear blunt or direct.  Medical documents are intended to carry relevant information, facts as evident, and the clinical opinion of the practitioner.

## 2025-04-17 ENCOUNTER — ROUTINE PRENATAL (OUTPATIENT)
Facility: CLINIC | Age: 35
End: 2025-04-17
Payer: COMMERCIAL

## 2025-04-17 ENCOUNTER — OFFICE VISIT (OUTPATIENT)
Dept: PERINATAL CARE | Facility: HOSPITAL | Age: 35
End: 2025-04-17
Attending: OBSTETRICS & GYNECOLOGY
Payer: COMMERCIAL

## 2025-04-17 VITALS
HEIGHT: 62 IN | DIASTOLIC BLOOD PRESSURE: 68 MMHG | HEART RATE: 97 BPM | BODY MASS INDEX: 34.96 KG/M2 | SYSTOLIC BLOOD PRESSURE: 112 MMHG | WEIGHT: 190 LBS

## 2025-04-17 VITALS
HEIGHT: 62 IN | SYSTOLIC BLOOD PRESSURE: 122 MMHG | WEIGHT: 187 LBS | HEART RATE: 93 BPM | BODY MASS INDEX: 34.41 KG/M2 | DIASTOLIC BLOOD PRESSURE: 79 MMHG

## 2025-04-17 DIAGNOSIS — O99.210 OBESITY AFFECTING PREGNANCY, ANTEPARTUM (HCC): ICD-10-CM

## 2025-04-17 DIAGNOSIS — O09.529 AMA (ADVANCED MATERNAL AGE) MULTIGRAVIDA 35+ (HCC): ICD-10-CM

## 2025-04-17 DIAGNOSIS — Z86.79 HISTORY OF CHRONIC HYPERTENSION: ICD-10-CM

## 2025-04-17 DIAGNOSIS — O09.523 MULTIGRAVIDA OF ADVANCED MATERNAL AGE IN THIRD TRIMESTER (HCC): ICD-10-CM

## 2025-04-17 DIAGNOSIS — O09.529 SUPERVISION OF HIGH-RISK PREGNANCY OF ELDERLY MULTIGRAVIDA (HCC): Primary | ICD-10-CM

## 2025-04-17 DIAGNOSIS — O09.529 AMA (ADVANCED MATERNAL AGE) MULTIGRAVIDA 35+ (HCC): Primary | ICD-10-CM

## 2025-04-17 PROCEDURE — 76819 FETAL BIOPHYS PROFIL W/O NST: CPT

## 2025-04-17 PROCEDURE — 76816 OB US FOLLOW-UP PER FETUS: CPT | Performed by: OBSTETRICS & GYNECOLOGY

## 2025-04-17 NOTE — PROGRESS NOTES
Pt here for growth ultrasound  + FM  Pt states feeling occas uterine tightening, denies vag bleeding and leaking fluid.

## 2025-04-17 NOTE — PROGRESS NOTES
Scar 32w6d    She is doing well, no complaints  She is trying to limit carbs, trying to eat more salad with olive oil     EDC by 6w5d US  -had first OB appointment with [facility], then was told her PCP needed to refer her to OB/GYN in network.   -NIPT neg GIRL  -carrier screen positive for CF, declined partner testing  Flu vaccine received at PCP   -1hr GTT & CBC: done  -3rd tri HIV & Tpal: done  -Tdap done     AMA   -NIPS vs amino discussed - wants NIPS, neg  -ASA taking  -L2US - normal   - NSTs at 36 wks  -growth US at 32 wks with MFM: efw 61%. BPP 8/8     Obesity (Pre pregnancy BMI 32)  -see  surveillance as above   -early 1 hr GTT added to PNL -normal     H/o Chronic HTN   -nadolol by PCP - had to stop taking, would become hypotensive. Is not currently on medication.   -baseline CMP, P/C ratio ordered - normal        ADHD   -was on Adderall -has stopped taking when found out she was pregnant        Fm Hx DM  -her father is a diabetic- takes metformin  -A1c added to PNL - normal, 4.7

## 2025-05-02 ENCOUNTER — ROUTINE PRENATAL (OUTPATIENT)
Facility: CLINIC | Age: 35
End: 2025-05-02
Payer: COMMERCIAL

## 2025-05-02 VITALS
BODY MASS INDEX: 35.15 KG/M2 | HEIGHT: 62 IN | SYSTOLIC BLOOD PRESSURE: 118 MMHG | HEART RATE: 104 BPM | DIASTOLIC BLOOD PRESSURE: 60 MMHG | WEIGHT: 191 LBS

## 2025-05-02 DIAGNOSIS — Z34.80 PRENATAL CARE, SUBSEQUENT PREGNANCY, ANTEPARTUM (HCC): Primary | ICD-10-CM

## 2025-05-02 NOTE — PROGRESS NOTES
Chief Complaint   Patient presents with    Prenatal Care     ABDULLAHI 35w 0d  C/o dizziness , b/I hand numbness and tingling      Not passing out but gets dizzy sometimes and then lays down to feel better  Hands with swelling and carpal tunnel like sx in Ams, not awful  Baby active      EDC by 6w5d US  -had first OB appointment with [facility], then was told her PCP needed to refer her to OB/GYN in network.   -NIPT neg GIRL  -carrier screen positive for CF, declined partner testing  Flu vaccine received at PCP   -1hr GTT & CBC: done  -3rd tri HIV & Tpal: done  -Tdap done  -interested in elective 39wk IOL - next wk check SVE and can schedule     AMA   -NIPS vs amino discussed - wants NIPS, neg  -ASA taking  -L2US - normal   - NSTs at 36 wks  -growth US at 32 wks with MFM: efw 61%. BPP 8/8     Obesity (Pre pregnancy BMI 32)  -see  surveillance as above   -early 1 hr GTT added to PNL -normal     H/o Chronic HTN?   -nadolol by PCP - had to stop taking, would become hypotensive. Is not currently on medication.   -baseline CMP, P/C ratio ordered - normal  -Bps normal throughout pregnancy thusfar     ADHD   -was on Adderall -has stopped taking when found out she was pregnant     Fm Hx DM  -her father is a diabetic- takes metformin  -A1c added to PNL - normal, 4.7    Considering sterilization  -discussed interval laparoscopic bilateral salpingectomy, pt may opt for IUD initially postpartum

## 2025-05-09 ENCOUNTER — ROUTINE PRENATAL (OUTPATIENT)
Dept: OBGYN CLINIC | Facility: CLINIC | Age: 35
End: 2025-05-09
Payer: COMMERCIAL

## 2025-05-09 VITALS
SYSTOLIC BLOOD PRESSURE: 116 MMHG | HEART RATE: 90 BPM | WEIGHT: 191.81 LBS | HEIGHT: 62 IN | BODY MASS INDEX: 35.3 KG/M2 | DIASTOLIC BLOOD PRESSURE: 88 MMHG

## 2025-05-09 DIAGNOSIS — O09.523 AMA (ADVANCED MATERNAL AGE) MULTIGRAVIDA 35+, THIRD TRIMESTER (HCC): ICD-10-CM

## 2025-05-09 DIAGNOSIS — Z36.89 DETERMINE FETAL PRESENTATION USING ULTRASOUND (HCC): ICD-10-CM

## 2025-05-09 DIAGNOSIS — O09.529 SUPERVISION OF HIGH-RISK PREGNANCY OF ELDERLY MULTIGRAVIDA (HCC): Primary | ICD-10-CM

## 2025-05-09 DIAGNOSIS — Z3A.36 36 WEEKS GESTATION OF PREGNANCY (HCC): ICD-10-CM

## 2025-05-09 PROCEDURE — 87150 DNA/RNA AMPLIFIED PROBE: CPT

## 2025-05-09 PROCEDURE — 87081 CULTURE SCREEN ONLY: CPT

## 2025-05-09 PROCEDURE — 59025 FETAL NON-STRESS TEST: CPT

## 2025-05-09 NOTE — PROGRESS NOTES
Scar 36w0d    She is doing well, no complaints  -gbs done today  -sve closed/30/-3 unable to determine presenting part. By leopolds feels like transverse lie. US ordered.     NST reactive     EDC by 6w5d US  -had first OB appointment with Miri, then was told her PCP needed to refer her to OB/GYN in network.   -NIPT neg GIRL  -carrier screen positive for CF, declined partner testing  Flu vaccine received at PCP   -1hr GTT & CBC: done  -3rd tri HIV & Tpal: done  -Tdap done  -interested in elective 39wk IOL - next wk check SVE and can schedule     AMA   -NIPS vs amino discussed - wants NIPS, neg  -ASA taking  -L2US - normal   - NSTs at 36 wks  -growth US at 32 wks with MFM: efw 61%. BPP 8/8     Obesity (Pre pregnancy BMI 32)  -see  surveillance as above   -early 1 hr GTT added to PNL -normal     H/o Chronic HTN?   -nadolol by PCP - had to stop taking, would become hypotensive. Is not currently on medication.   -baseline CMP, P/C ratio ordered - normal  -Bps normal throughout pregnancy thusfar     ADHD   -was on Adderall -has stopped taking when found out she was pregnant     Fm Hx DM  -her father is a diabetic- takes metformin  -A1c added to PNL - normal, 4.7     Considering sterilization  -discussed interval laparoscopic bilateral salpingectomy, pt may opt for IUD initially postpartum

## 2025-05-11 LAB — GROUP B STREP BY PCR FOR PCR OVT: POSITIVE

## 2025-05-13 ENCOUNTER — TELEPHONE (OUTPATIENT)
Facility: CLINIC | Age: 35
End: 2025-05-13

## 2025-05-13 ENCOUNTER — ULTRASOUND ENCOUNTER (OUTPATIENT)
Facility: CLINIC | Age: 35
End: 2025-05-13
Payer: COMMERCIAL

## 2025-05-13 NOTE — TELEPHONE ENCOUNTER
Spoke with patient. 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 Her FMLA states leave to start on or around 6/6/25 but since 2 weeks sooner they require a letter. She has been off the schedule for 2 weeks now. States if we are unable to provide the letter she will need to call off work. Aware I will route to a provider and call with recommendations.

## 2025-05-13 NOTE — TELEPHONE ENCOUNTER
Pt requested a note which estates the exact date she will start her fmla date. Please send the note she needs for her job, thru [email or web address].   Thanks

## 2025-05-14 ENCOUNTER — TELEPHONE (OUTPATIENT)
Facility: CLINIC | Age: 35
End: 2025-05-14

## 2025-05-14 NOTE — TELEPHONE ENCOUNTER
Spoke with patient. She is aware her recovery time will be shortened. She planned to go back to work early so she would rather start her leave 5/16/25. Will get the ok from [employer] and send letter to patients [place]. Verbalized understanding.

## 2025-05-20 ENCOUNTER — ROUTINE PRENATAL (OUTPATIENT)
Dept: OBGYN CLINIC | Facility: CLINIC | Age: 35
End: 2025-05-20
Payer: COMMERCIAL

## 2025-05-20 VITALS
SYSTOLIC BLOOD PRESSURE: 120 MMHG | HEART RATE: 89 BPM | DIASTOLIC BLOOD PRESSURE: 70 MMHG | BODY MASS INDEX: 35.3 KG/M2 | HEIGHT: 62 IN | WEIGHT: 191.81 LBS

## 2025-05-20 DIAGNOSIS — Z3A.37 37 WEEKS GESTATION OF PREGNANCY (HCC): ICD-10-CM

## 2025-05-20 DIAGNOSIS — Z34.80 PRENATAL CARE, SUBSEQUENT PREGNANCY, ANTEPARTUM (HCC): Primary | ICD-10-CM

## 2025-05-20 DIAGNOSIS — O09.523 AMA (ADVANCED MATERNAL AGE) MULTIGRAVIDA 35+, THIRD TRIMESTER (HCC): ICD-10-CM

## 2025-05-20 PROCEDURE — 59025 FETAL NON-STRESS TEST: CPT | Performed by: STUDENT IN AN ORGANIZED HEALTH CARE EDUCATION/TRAINING PROGRAM

## 2025-05-20 NOTE — PROGRESS NOTES
Scar 37.4    She is doing well, no complaints  Has a cough - doing vitamin C     NST w 1 variable followed by 30 min of normal tracing, reassuring  Declines SVE today - was closed last week     EDC by 6w5d US  -had first OB appointment with Miri, then was told her PCP needed to refer her to OB/GYN in network.   -NIPT neg GIRL  -carrier screen positive for CF, declined partner testing  Flu vaccine received at PCP   -1hr GTT & CBC: done  -3rd tri HIV & Tpal: done  -Tdap done  -GBS positive   -interested in elective 39wk IOL- order sent     AMA   -NIPS vs amino discussed - wants NIPS, neg  -ASA taking  -L2US - normal   - NSTs at 36 wks  -growth US at 32 wks with MFM: efw 61%. BPP 8/8     Obesity (Pre pregnancy BMI 32)  -see  surveillance as above   -early 1 hr GTT added to PNL -normal     H/o Chronic HTN?   -nadolol by PCP - had to stop taking, would become hypotensive. Is not currently on medication.   -baseline CMP, P/C ratio ordered - normal  -Bps normal throughout pregnancy thusfar     ADHD   -was on Adderall -has stopped taking when found out she was pregnant     Fm Hx DM  -her father is a diabetic- takes metformin  -A1c added to PNL - normal, 4.7     Considering sterilization  -discussed interval laparoscopic bilateral salpingectomy, pt may opt for IUD initially postpartum

## 2025-05-21 ENCOUNTER — TELEPHONE (OUTPATIENT)
Facility: CLINIC | Age: 35
End: 2025-05-21

## 2025-05-21 NOTE — TELEPHONE ENCOUNTER
----- Message from Kathie Frias sent at 5/20/2025  1:37 PM CDT -----  Regarding: Please schedule IOL at 39 weeks for advanced maternal age  Please schedule IOL at 39 weeks for advanced maternal ageDeclinees SVE today but was closed at her last exam.  I would make it cytotec for now unless next appt she gets an SVE that shows her to be open.

## 2025-05-23 ENCOUNTER — HOSPITAL ENCOUNTER (INPATIENT)
Facility: HOSPITAL | Age: 35
LOS: 2 days | Discharge: HOME | End: 2025-05-25
Attending: STUDENT IN AN ORGANIZED HEALTH CARE EDUCATION/TRAINING PROGRAM | Admitting: STUDENT IN AN ORGANIZED HEALTH CARE EDUCATION/TRAINING PROGRAM
Payer: COMMERCIAL

## 2025-05-23 ENCOUNTER — ANESTHESIA (OUTPATIENT)
Dept: OBGYN UNIT | Facility: HOSPITAL | Age: 35
End: 2025-05-23
Payer: COMMERCIAL

## 2025-05-23 ENCOUNTER — TELEPHONE (OUTPATIENT)
Facility: CLINIC | Age: 35
End: 2025-05-23

## 2025-05-23 ENCOUNTER — ANESTHESIA EVENT (OUTPATIENT)
Dept: OBGYN UNIT | Facility: HOSPITAL | Age: 35
End: 2025-05-23
Payer: COMMERCIAL

## 2025-05-23 PROBLEM — O41.1290 CHORIOAMNIONITIS (HCC): Status: ACTIVE | Noted: 2025-05-23

## 2025-05-23 PROBLEM — Z34.90 PREGNANCY (HCC): Status: ACTIVE | Noted: 2025-05-23

## 2025-05-23 PROBLEM — O42.90: Status: ACTIVE | Noted: 2025-05-23

## 2025-05-23 LAB
ALBUMIN SERPL-MCNC: 3.9 G/DL (ref 3.2–4.8)
ALBUMIN/GLOB SERPL: 1.2 {RATIO} (ref 1–2)
ALP LIVER SERPL-CCNC: 267 U/L (ref 37–98)
ALT SERPL-CCNC: 45 U/L (ref 10–49)
ANION GAP SERPL CALC-SCNC: 9 MMOL/L (ref 0–18)
ANTIBODY SCREEN: NEGATIVE
AST SERPL-CCNC: 35 U/L (ref ?–34)
BASOPHILS # BLD AUTO: 0.01 X10(3) UL (ref 0–0.2)
BASOPHILS NFR BLD AUTO: 0.1 %
BILIRUB SERPL-MCNC: 0.7 MG/DL (ref 0.3–1.2)
BUN BLD-MCNC: 8 MG/DL (ref 9–23)
CALCIUM BLD-MCNC: 9.9 MG/DL (ref 8.7–10.6)
CHLORIDE SERPL-SCNC: 109 MMOL/L (ref 98–112)
CO2 SERPL-SCNC: 20 MMOL/L (ref 21–32)
CREAT BLD-MCNC: 0.73 MG/DL (ref 0.55–1.02)
CREAT UR-SCNC: 130.9 MG/DL
EGFRCR SERPLBLD CKD-EPI 2021: 110 ML/MIN/1.73M2 (ref 60–?)
EOSINOPHIL # BLD AUTO: 0.05 X10(3) UL (ref 0–0.7)
EOSINOPHIL NFR BLD AUTO: 0.7 %
ERYTHROCYTE [DISTWIDTH] IN BLOOD BY AUTOMATED COUNT: 12.9 %
FASTING STATUS PATIENT QL REPORTED: NO
GLOBULIN PLAS-MCNC: 3.3 G/DL (ref 2–3.5)
GLUCOSE BLD-MCNC: 75 MG/DL (ref 70–99)
HCT VFR BLD AUTO: 38.1 % (ref 35–48)
HGB BLD-MCNC: 13.4 G/DL (ref 12–16)
IMM GRANULOCYTES # BLD AUTO: 0.02 X10(3) UL (ref 0–1)
IMM GRANULOCYTES NFR BLD: 0.3 %
LYMPHOCYTES # BLD AUTO: 2.16 X10(3) UL (ref 1–4)
LYMPHOCYTES NFR BLD AUTO: 31.9 %
MCH RBC QN AUTO: 31.8 PG (ref 26–34)
MCHC RBC AUTO-ENTMCNC: 35.2 G/DL (ref 31–37)
MCV RBC AUTO: 90.3 FL (ref 80–100)
MONOCYTES # BLD AUTO: 0.5 X10(3) UL (ref 0.1–1)
MONOCYTES NFR BLD AUTO: 7.4 %
NEUTROPHILS # BLD AUTO: 4.03 X10 (3) UL (ref 1.5–7.7)
NEUTROPHILS # BLD AUTO: 4.03 X10(3) UL (ref 1.5–7.7)
NEUTROPHILS NFR BLD AUTO: 59.6 %
OSMOLALITY SERPL CALC.SUM OF ELEC: 283 MOSM/KG (ref 275–295)
PLATELET # BLD AUTO: 316 10(3)UL (ref 150–450)
POTASSIUM SERPL-SCNC: 4.5 MMOL/L (ref 3.5–5.1)
PROT SERPL-MCNC: 7.2 G/DL (ref 5.7–8.2)
PROT UR-MCNC: 55.4 MG/DL (ref ?–14)
PROT/CREAT UR-RTO: 0.42
RBC # BLD AUTO: 4.22 X10(6)UL (ref 3.8–5.3)
RH BLOOD TYPE: POSITIVE
SODIUM SERPL-SCNC: 138 MMOL/L (ref 136–145)
T PALLIDUM AB SER QL IA: NONREACTIVE
URATE SERPL-MCNC: 6.2 MG/DL (ref 3.1–7.8)
WBC # BLD AUTO: 6.8 X10(3) UL (ref 4–11)

## 2025-05-23 PROCEDURE — 3E033VJ INTRODUCTION OF OTHER HORMONE INTO PERIPHERAL VEIN, PERCUTANEOUS APPROACH: ICD-10-PCS | Performed by: STUDENT IN AN ORGANIZED HEALTH CARE EDUCATION/TRAINING PROGRAM

## 2025-05-23 PROCEDURE — 59410 OBSTETRICAL CARE: CPT | Performed by: STUDENT IN AN ORGANIZED HEALTH CARE EDUCATION/TRAINING PROGRAM

## 2025-05-23 PROCEDURE — 0KQM0ZZ REPAIR PERINEUM MUSCLE, OPEN APPROACH: ICD-10-PCS | Performed by: STUDENT IN AN ORGANIZED HEALTH CARE EDUCATION/TRAINING PROGRAM

## 2025-05-23 RX ORDER — ACETAMINOPHEN 500 MG
1000 TABLET ORAL EVERY 6 HOURS PRN
Status: DISCONTINUED | OUTPATIENT
Start: 2025-05-23 | End: 2025-05-25

## 2025-05-23 RX ORDER — TERBUTALINE SULFATE 1 MG/ML
0.25 INJECTION SUBCUTANEOUS AS NEEDED
Status: DISCONTINUED | OUTPATIENT
Start: 2025-05-23 | End: 2025-05-23 | Stop reason: HOSPADM

## 2025-05-23 RX ORDER — ACETAMINOPHEN 500 MG
1000 TABLET ORAL EVERY 6 HOURS PRN
Status: DISCONTINUED | OUTPATIENT
Start: 2025-05-23 | End: 2025-05-23

## 2025-05-23 RX ORDER — ACETAMINOPHEN 500 MG
500 TABLET ORAL EVERY 6 HOURS PRN
Status: DISCONTINUED | OUTPATIENT
Start: 2025-05-23 | End: 2025-05-25

## 2025-05-23 RX ORDER — LIDOCAINE HYDROCHLORIDE 20 MG/ML
5 INJECTION, SOLUTION EPIDURAL; INFILTRATION; INTRACAUDAL; PERINEURAL AS NEEDED
Status: DISCONTINUED | OUTPATIENT
Start: 2025-05-23 | End: 2025-05-23

## 2025-05-23 RX ORDER — DOCUSATE SODIUM 100 MG/1
100 CAPSULE, LIQUID FILLED ORAL
Status: DISCONTINUED | OUTPATIENT
Start: 2025-05-23 | End: 2025-05-25

## 2025-05-23 RX ORDER — AMMONIA 15 % (W/V)
0.3 AMPUL (EA) INHALATION AS NEEDED
Status: DISCONTINUED | OUTPATIENT
Start: 2025-05-23 | End: 2025-05-25

## 2025-05-23 RX ORDER — IBUPROFEN 600 MG/1
600 TABLET, FILM COATED ORAL EVERY 6 HOURS
Status: DISCONTINUED | OUTPATIENT
Start: 2025-05-23 | End: 2025-05-25

## 2025-05-23 RX ORDER — BUPIVACAINE HYDROCHLORIDE 2.5 MG/ML
30 INJECTION, SOLUTION EPIDURAL; INFILTRATION; INTRACAUDAL; PERINEURAL AS NEEDED
Status: DISCONTINUED | OUTPATIENT
Start: 2025-05-23 | End: 2025-05-23

## 2025-05-23 RX ORDER — LIDOCAINE HYDROCHLORIDE AND EPINEPHRINE 15; 5 MG/ML; UG/ML
INJECTION, SOLUTION EPIDURAL AS NEEDED
Status: DISCONTINUED | OUTPATIENT
Start: 2025-05-23 | End: 2025-05-23 | Stop reason: SURG

## 2025-05-23 RX ORDER — NALBUPHINE HYDROCHLORIDE 10 MG/ML
2.5 INJECTION INTRAMUSCULAR; INTRAVENOUS; SUBCUTANEOUS
Status: DISCONTINUED | OUTPATIENT
Start: 2025-05-23 | End: 2025-05-23

## 2025-05-23 RX ORDER — SODIUM CHLORIDE 9 MG/ML
INJECTION, SOLUTION INTRAMUSCULAR; INTRAVENOUS; SUBCUTANEOUS
Status: DISCONTINUED
Start: 2025-05-23 | End: 2025-05-23 | Stop reason: WASHOUT

## 2025-05-23 RX ORDER — BUPIVACAINE HCL/0.9 % NACL/PF 0.25 %
5 PLASTIC BAG, INJECTION (ML) EPIDURAL AS NEEDED
Status: DISCONTINUED | OUTPATIENT
Start: 2025-05-23 | End: 2025-05-23

## 2025-05-23 RX ORDER — SODIUM CHLORIDE 9 MG/ML
10 INJECTION, SOLUTION INTRAMUSCULAR; INTRAVENOUS; SUBCUTANEOUS AS NEEDED
Status: DISCONTINUED | OUTPATIENT
Start: 2025-05-23 | End: 2025-05-23

## 2025-05-23 RX ORDER — NIFEDIPINE 30 MG/1
30 TABLET, EXTENDED RELEASE ORAL DAILY
Status: DISCONTINUED | OUTPATIENT
Start: 2025-05-23 | End: 2025-05-25

## 2025-05-23 RX ORDER — BISACODYL 10 MG
10 SUPPOSITORY, RECTAL RECTAL ONCE AS NEEDED
Status: DISCONTINUED | OUTPATIENT
Start: 2025-05-23 | End: 2025-05-25

## 2025-05-23 RX ORDER — ONDANSETRON 2 MG/ML
4 INJECTION INTRAMUSCULAR; INTRAVENOUS EVERY 6 HOURS PRN
Status: DISCONTINUED | OUTPATIENT
Start: 2025-05-23 | End: 2025-05-23 | Stop reason: HOSPADM

## 2025-05-23 RX ORDER — CITRIC ACID/SODIUM CITRATE 334-500MG
30 SOLUTION, ORAL ORAL AS NEEDED
Status: DISCONTINUED | OUTPATIENT
Start: 2025-05-23 | End: 2025-05-23 | Stop reason: HOSPADM

## 2025-05-23 RX ORDER — ACETAMINOPHEN 500 MG
500 TABLET ORAL EVERY 6 HOURS PRN
Status: DISCONTINUED | OUTPATIENT
Start: 2025-05-23 | End: 2025-05-23

## 2025-05-23 RX ORDER — BUPIVACAINE HCL/0.9 % NACL/PF 0.25 %
PLASTIC BAG, INJECTION (ML) EPIDURAL
Status: DISPENSED
Start: 2025-05-23 | End: 2025-05-24

## 2025-05-23 RX ORDER — DEXTROSE, SODIUM CHLORIDE, SODIUM LACTATE, POTASSIUM CHLORIDE, AND CALCIUM CHLORIDE 5; .6; .31; .03; .02 G/100ML; G/100ML; G/100ML; G/100ML; G/100ML
INJECTION, SOLUTION INTRAVENOUS AS NEEDED
Status: DISCONTINUED | OUTPATIENT
Start: 2025-05-23 | End: 2025-05-23 | Stop reason: HOSPADM

## 2025-05-23 RX ORDER — AMPICILLIN 2 G/1
INJECTION, POWDER, FOR SOLUTION INTRAVENOUS
Status: DISPENSED
Start: 2025-05-23 | End: 2025-05-24

## 2025-05-23 RX ORDER — ROPIVACAINE HYDROCHLORIDE 5 MG/ML
30 INJECTION, SOLUTION EPIDURAL; INFILTRATION; PERINEURAL AS NEEDED
Status: DISCONTINUED | OUTPATIENT
Start: 2025-05-23 | End: 2025-05-23 | Stop reason: HOSPADM

## 2025-05-23 RX ORDER — MISOPROSTOL 200 UG/1
TABLET ORAL
Status: COMPLETED
Start: 2025-05-23 | End: 2025-05-23

## 2025-05-23 RX ORDER — SODIUM CHLORIDE, SODIUM LACTATE, POTASSIUM CHLORIDE, CALCIUM CHLORIDE 600; 310; 30; 20 MG/100ML; MG/100ML; MG/100ML; MG/100ML
INJECTION, SOLUTION INTRAVENOUS CONTINUOUS
Status: DISCONTINUED | OUTPATIENT
Start: 2025-05-23 | End: 2025-05-23 | Stop reason: HOSPADM

## 2025-05-23 RX ORDER — TRANEXAMIC ACID 10 MG/ML
INJECTION, SOLUTION INTRAVENOUS
Status: COMPLETED
Start: 2025-05-23 | End: 2025-05-23

## 2025-05-23 RX ORDER — LIDOCAINE HYDROCHLORIDE AND EPINEPHRINE 15; 5 MG/ML; UG/ML
5 INJECTION, SOLUTION EPIDURAL AS NEEDED
Status: DISCONTINUED | OUTPATIENT
Start: 2025-05-23 | End: 2025-05-23

## 2025-05-23 RX ORDER — SIMETHICONE 80 MG
80 TABLET,CHEWABLE ORAL 3 TIMES DAILY PRN
Status: DISCONTINUED | OUTPATIENT
Start: 2025-05-23 | End: 2025-05-25

## 2025-05-23 RX ORDER — IBUPROFEN 600 MG/1
600 TABLET, FILM COATED ORAL ONCE AS NEEDED
Status: COMPLETED | OUTPATIENT
Start: 2025-05-23 | End: 2025-05-23

## 2025-05-23 RX ADMIN — LIDOCAINE HYDROCHLORIDE AND EPINEPHRINE 5 ML: 15; 5 INJECTION, SOLUTION EPIDURAL at 14:42:00

## 2025-05-23 NOTE — DISCHARGE INSTRUCTIONS
Postpartum care: What to expect after a vaginal birth  When caring for a , you might forget to care for yourself. But that's important too. Learn what's involved as you recover from giving birth.  Pregnancy changes a body in more ways than you might expect. And that doesn't stop when you give birth. Here's what can happen physically and emotionally after a vaginal delivery.  Vaginal soreness  You might have had a tear in your vagina during delivery. Or your healthcare professional may have made a cut in the vaginal opening, called an episiotomy, to make delivery easier. The wound may hurt for a few weeks. Large tears can take longer to heal. To ease the pain:  Sit on a pillow or padded ring.  Cool the area with an ice pack. Or put a chilled witch hazel pad between a sanitary napkin and the area between your vaginal opening and anus. That area is called the perineum.  Use a squirt bottle to spray warm water over the perineum as you urinate.  Sit in a warm bath just deep enough to cover your buttocks and hips for five minutes. Use cold water if it feels better.  Take a pain reliever that you can buy without a prescription. Ask your healthcare professional about a numbing spray or cream, if needed.  .Avoid straining due to constipation through adequate hydration and a diet that incorporates whole foods that are plant-based.  If this is not enough to keep your stools a toothpaste like consistency, please add over-the-counter fiber supplementation like Metamucil or a daily osmotic laxative like Miralax.  You can take one capful of Miralax with water or juice each morning and, as needed, in the evening.  While having a bowel movement, you can use can also use a stool under your feet or a squatty potty to help prevent straining.  Tell your healthcare professional if you have intense pain, lasting pain or if the pain gets worse. It could be a sign of an infection.    Vaginal discharge  After delivery, a mix of blood,  mucus and tissue from the uterus comes out of the vagina. This is called discharge. The discharge changes color and lessens over 4 to 6 weeks after a baby is born. It starts bright red, then turns darker red. After that, it usually turns yellow or white. The discharge then slows and becomes watery until it stops.  Contact your healthcare professional if blood from your vagina soaks a pad hourly for two hours in a row, especially if you also have a fever, pelvic pain or tenderness.  Contractions  You might feel contractions, sometimes called afterpains, for a few days after delivery. These contractions often feel like menstrual cramps. They help keep you from bleeding too much because they put pressure on the blood vessels in the uterus. Afterpains are common during breastfeeding. That's because breastfeeding causes the release of the hormone oxytocin.  To ease the pain, you can use acetaminophen (Tylenol, others) or ibuprofen (Advil, Motrin IB, others).  Leaking urine  Pregnancy, labor and a vaginal delivery can stretch or hurt your pelvic floor muscles. These muscles support the uterus, bladder and rectum. As a result, some urine might leak when you sneeze, laugh or cough. The leaking usually gets better within a week. But it might go on longer. Leaking urine also is called incontinence.  Until the leaking stops, wear sanitary pads. Do pelvic floor muscle training, also called Kegels, to tone your pelvic floor muscles and help control your bladder.  To do Kegels, think of sitting on a marble. Tighten your pelvic muscles as if you're lifting the marble. Try it for three seconds at a time, then relax for a count of three. Work up to doing the exercise 10 to 15 times in a row, at least three times a day. To make sure you're doing Kegels right, it might help to see a physical therapist who specializes in pelvic floor exercises.  Hemorrhoids and bowel movements  If you notice pain during bowel movements and feel  swelling near your anus, you might have swollen veins in the anus or lower rectum, called hemorrhoids. To ease hemorrhoid pain:  Use a hemorrhoid cream or a medicine that you put into your anus, called a suppository, that has hydrocortisone. You can buy either without a prescription.  Wipe the area with pads that have witch hazel or a numbing agent.  Soak your anal area in plain warm water for 10 to 15 minutes 2 to 3 times a day.  You might be afraid to have a bowel movement because you don't want to make the pain of hemorrhoids or your episiotomy wound worse. Take steps to keep stools soft and regular. Eat foods high in fiber, including fruits, vegetables and whole grains. Drink plenty of water. Ask your healthcare professional about a stool softener, if needed.  Sore breasts  A few days after giving birth, you might have full, firm, sore breasts. That's because your breast tissue overfills with milk, blood and other fluids. This condition is called engorgement. Breastfeed your baby often on both breasts to help keep them from overfilling.  If your breasts are engorged, your baby might have trouble attaching for breastfeeding. To help your baby latch on, you can use your hand or a breast pump to let out some breast milk before feeding your baby. That process is called expressing.  To ease sore breasts, put warm washcloths on them or take a warm shower before breastfeeding or expressing. That can make it easier for the milk to flow. Between feedings, put cold washcloths on your breasts. Pain relievers you can buy without a prescription might help too.  If you're not breastfeeding, wear a bra that supports your breasts, such as a sports bra. Don't pump your breasts or express the milk. That causes your breasts to make more milk. Putting ice packs on your breasts can ease discomfort. Pain relievers available without a prescription also can be helpful.  Hair loss and skin changes  During pregnancy, higher hormone  levels mean your hair grows faster than it sheds. The result is more hair on your head. But for up to five months after giving birth, you lose more hair than you grow. This hair loss stops over time.  Stretch marks on the skin don't go away after delivery. But in time, they fade. Expect any skin that got darker during pregnancy, such as dark patches on your face, to fade slowly too.  Mood changes  Childbirth can trigger a lot of feelings. Many people have a period of feeling down or anxious after giving birth, sometimes called the baby blues. Symptoms include mood swings, crying spells, anxiety and trouble sleeping. These feelings often go away within two weeks. In the meantime, take good care of yourself. Share your feelings, and ask your partner, loved ones or friends for help.  If you have large mood swings, don't feel like eating, are very tired and lack juani in life shortly after childbirth, you might have postpartum depression. Contact your healthcare professional if you think you might be depressed. Be sure to seek help if:  Your symptoms don't go away on their own.  You have trouble caring for your baby.  You have a hard time doing daily tasks.  You think of harming yourself or your baby.    Medicines and counseling often can ease postpartum depression.  Please talk to your provider if you are interested in either of these treatments.  Weight loss  It's common to still look pregnant after giving birth. Most people lose about 13 pounds (6 kilograms) during delivery. This loss includes the weight of the baby, placenta and amniotic fluid.  In the days after delivery, you'll lose more weight from leftover fluids. After that, a healthy diet and regular exercise can help you to return to the weight you were before pregnancy.  Postpartum checkups  The American College of Obstetricians [employer] Gynec[employer] says that postpartum care should be an ongoing process rather than a single visit after delivery. Check in with  your healthcare professional within 2 to 3 weeks after delivery by phone or in person to talk about any issues you've had since giving birth.  Within 6 to 12 weeks after delivery, see your healthcare professional for a complete postpartum exam. During this visit, your healthcare professional does a physical exam and checks your belly, vagina, cervix and uterus to see how well you're healing.  Things to talk about at this visit include:  Your mood and emotional well-being.  How well you're sleeping.  Other symptoms you might have, such as tiredness.  Birth control and birth spacing.  Baby care and feeding.  When you can start having sex again.  What you can do about pain with sex or not wanting to have sex.  How you're adjusting to life with a new baby.  This checkup is a chance for you and your healthcare professional to make sure you're OK. It's also a time to get answers to questions you have about life after giving birth.

## 2025-05-23 NOTE — ANESTHESIA PREPROCEDURE EVALUATION
PRE-OP EVALUATION    Patient Name: Santos Carr    Admit Diagnosis: pregnancy  Pregnancy (HCC)    Pre-op Diagnosis: * No surgery found *        Anesthesia Procedure: LABOR ANALGESIA    * Surgery not found *    Pre-op vitals reviewed.  Temp: 97.4 °F (36.3 °C)  Pulse: 60  Resp: 17  BP: 140/81     Body mass index is 35.07 kg/m².    Current medications reviewed.  Hospital Medications:  Current Medications[1]    Outpatient Medications:   Prescriptions Prior to Admission[2]    Allergies: Patient has no known allergies.      Anesthesia Evaluation    Patient summary reviewed.    Anesthetic Complications  (-) history of anesthetic complications         GI/Hepatic/Renal    Negative GI/hepatic/renal ROS.                             Cardiovascular    Negative cardiovascular ROS.    Exercise tolerance: good     MET: >4                                           Endo/Other    Negative endo/other ROS.                              Pulmonary      (+) asthma                     Neuro/Psych    Negative neuro/psych ROS.                                  Past Surgical History[3]  Social Hx on file[4]  History   Drug Use Unknown     Available pre-op labs reviewed.  Lab Results   Component Value Date    WBC 6.8 05/23/2025    RBC 4.22 05/23/2025    HGB 13.4 05/23/2025    HCT 38.1 05/23/2025    MCV 90.3 05/23/2025    MCH 31.8 05/23/2025    MCHC 35.2 05/23/2025    RDW 12.9 05/23/2025    .0 05/23/2025     Lab Results   Component Value Date     05/23/2025    K 4.5 05/23/2025     05/23/2025    CO2 20.0 (L) 05/23/2025    BUN 8 (L) 05/23/2025    CREATSERUM 0.73 05/23/2025    GLU 75 05/23/2025    CA 9.9 05/23/2025            Airway      Mallampati: II  Mouth opening: >3 FB  TM distance: 4 - 6 cm  Neck ROM: full Cardiovascular      Rhythm: regular  Rate: normal     Dental    Dentition appears grossly intact         Pulmonary      Breath sounds clear to auscultation bilaterally.               Other findings              ASA:  2   Plan: epidural  NPO status verified and           Plan/risks discussed with: patient (Risks of epidural discussed including bleeding, infection and nerve damage.)                Present on Admission:  **None**             [1]    lactated ringers infusion   Intravenous Continuous    dextrose in lactated ringers 5% infusion   Intravenous PRN    lactated ringers IV bolus 500 mL  500 mL Intravenous PRN    acetaminophen (Tylenol Extra Strength) tab 500 mg  500 mg Oral Q6H PRN    acetaminophen (Tylenol Extra Strength) tab 1,000 mg  1,000 mg Oral Q6H PRN    ibuprofen (Motrin) tab 600 mg  600 mg Oral Once PRN    ondansetron (Zofran) 4 MG/2ML injection 4 mg  4 mg Intravenous Q6H PRN    oxyTOCIN in sodium chloride 0.9% (Pitocin) 30 Units/500mL infusion premix  62.5-900 yumiko-units/min Intravenous Continuous    terbutaline (Brethine) 1 MG/ML injection 0.25 mg  0.25 mg Subcutaneous PRN    sodium citrate-citric acid (Bicitra) 500-334 MG/5ML oral solution 30 mL  30 mL Oral PRN    ropivacaine (Naropin) 0.5% injection  30 mL Injection PRN    [COMPLETED] ampicillin (Omnipen) 2 g in sodium chloride 0.9% 100 mL IVPB-YONG  2 g Intravenous Once    Followed by    ampicillin (Omnipen) 1 g in sodium chloride 0.9% 100mL IVPB-YONG  1 g Intravenous Q4H    oxyTOCIN in sodium chloride 0.9% (Pitocin) 30 Units/500mL infusion premix  0.5-20 yumiko-units/min Intravenous Continuous    lactated ringers IV bolus 1,000 mL  1,000 mL Intravenous Once    fentaNYL-bupivacaine 2 mcg/mL-0.125% in sodium chloride 0.9% 100 mL EPIDURAL infusion premix  12 mL/hr Epidural Continuous    fentaNYL (Sublimaze) 50 mcg/mL injection 100 mcg  100 mcg Epidural Once    lidocaine 1.5%-EPINEPHrine 1:200,000 (Xylocaine-Epinephrine) injection  5 mL Injection PRN    bupivacaine PF (Marcaine) 0.25% injection  30 mL Injection PRN    lidocaine PF (Xylocaine-MPF) 2% injection  5 mL Injection PRN    sodium chloride 0.9% PF injection 10 mL  10 mL Injection PRN    ePHEDrine (PF) 25  MG/5 ML injection 5 mg  5 mg Intravenous PRN    nalbuphine (Nubain) 10 mg/mL injection 2.5 mg  2.5 mg Intravenous Q15 Min PRN    ampicillin (Omnipen) 2 g injection        sodium chloride 0.9% PF 0.9% injection        fentaNYL-bupivacaine in sodium chloride 0.9% 2 mcg/mL-0.125% EPIDURAL infusion premix        fentaNYL (Sublimaze) 50 mcg/mL injection        ePHEDrine (PF) 25 MG/5 ML injection       [2]   Medications Prior to Admission   Medication Sig Dispense Refill Last Dose/Taking    Polyethylene Glycol 3350 (MIRALAX) 17 g Oral Powd Pack  (Patient not taking: Reported on 5/20/2025)       aspirin 81 MG Oral Tab EC Take 2 tablets (162 mg total) by mouth in the morning. (Patient not taking: Reported on 5/20/2025)       Budesonide-Formoterol Fumarate 160-4.5 MCG/ACT Inhalation Aerosol Inhale 2 puffs into the lungs 2 (two) times daily. (Patient not taking: Reported on 3/21/2025)       prenatal multivitamin plus DHA 27-0.8-228 MG Oral Cap Take 1 capsule by mouth in the morning.      [3]   Past Surgical History:  Procedure Laterality Date    Appendectomy  1994    Breast surgery      D & c      Other Left 2006    breast cyst removal   [4]   Social History  Socioeconomic History    Marital status: Single   Tobacco Use    Smoking status: Former     Current packs/day: 0.00     Types: Cigarettes    Smokeless tobacco: Never   Vaping Use    Vaping status: Never Used   Substance and Sexual Activity    Alcohol use: Not Currently    Drug use: Never   Other Topics Concern    Caffeine Concern No    Exercise No    Seat Belt No    Special Diet No    Stress Concern No    Weight Concern No

## 2025-05-23 NOTE — L&D DELIVERY NOTE
Lilly Girl [[id number]]      Labor Events     labor?: No   steroids?: None  Antibiotics received during labor?: Yes  Antibiotics (enter # doses in comment): ampicillin  Labor type: Induced Onset of Labor  Induction: Oxytocin  Intrapartum & labor complications: Fetal tachycardia       Labor Event Times    Dilation complete date/time: 2025 1655       [place] Presentation    Presentation: Vertex  Position: Occiput Anterior       Operative Delivery    Operative Vaginal Delivery: N/A                Shoulder Dystocia    Shoulder Dystocia: N/A       Anesthesia    Method: Epidural               Delivery      Delivery date/time:  25 17:24:53   Delivery type: Normal spontaneous vaginal delivery    Details:     Delivery location: delivery room       Delivery Providers    Delivering Clinician: Yasmin Alfred MD   Delivery personnel:  Provider Role   Samantha Quintero RN Baby Nurse   Susan Estrada RN Delivery Nurse             Cord    Vessels: 3 Vessels  Complications: Nuchal  # of loops: 1  Timed cord clamping: Yes  Time in sec: 60  Cord blood disposition: to lab  Gases sent?: Yes       Resuscitation    Method: None       [place] Measurements      Weight: 2840 g 6 lb 4.2 oz Length: 47 cm     Head circum.: 32 cm Chest circum.: 32.5 cm      Abdominal circum.: 27 cm           Placenta    Date/time: 2025 17:30  Removal: Spontaneous  Appearance: Intact  Disposition: Pathology       Apgars    Living status: Living   Apgar Scoring Key:    0 1 2    Skin color Blue or pale Acrocyanotic Completely pink    Heart rate Absent <100 bpm >100 bpm    Reflex irritability No response Grimace Cry or active withdrawal    Muscle tone Limp Some flexion Active motion    Respiratory effort Absent Weak cry; hypoventilation Good, crying              1 Minute:  5 Minute:  10 Minute:  15 Minute:  20 Minute:      Skin color: 0  1       Heart rate: 2  2       Reflex irritablity: 2  2       Muscle tone:  2  2       Respiratory effort: 2  2       Total: 8  9          Apgars assigned by: PRATEEK BONILLA RN  [place] disposition: with mother       Skin to Skin    No data filed       Vaginal Count    Initial count RN: Susan Estrada RN  Initial count Tech: Nani Marie    Initial counts 11   0    Final counts        Final count RN: Susan Estrada RN  Final count MD: Yasmin Alfred MD       Lacerations    Episiotomy: None  Perineal lacerations: 2nd Repaired?: Yes     Vaginal laceration?: No      Cervical laceration?: No    Clitoral laceration?: No                  Vaginal Delivery Note          Santos Carr Patient Status:  Inpatient    1990 MRN [id number]   [facility] LABOR & DELIVERY Attending Yasmin Alfred MD   Hosp Day # 0 PCP Humberto Winkler MD     Date of Delivery: 25    Pre Op Dx:  IUP at Term    Post Op Dx: Same - delivered, suspected chorioamnionitis     Op: Normal Spontaneous Vaginal Delivery    Surgeon: Yasmin Alfred MD      Anesthesia: Epidural      Indications:  Patient is a 34 year old  at 38w0d who presented for PROM. Pitocin and ampicillin (for GBS+)  was initiated. She progressed to complete cervical dilation.     Findings:    Sex: female     Weight:2840g      Apgars: 8/9      Lacerations: 2nd perineal laceration  Nuchal: x 1      Procedure:  The patients was placed in the dorsolithotomy position and prepped.  She was encouraged to push.  As the head was delivered in MANAS position, the perineum was supported to decrease the risk of tearing. The shoulders rotated easily and delivery was completed without complication.  Bulb suction was performed.  The cord was doubly clamped then cut after 60 seconds of cord pulsation noted.  The baby was placed on the mother's abdomen at her request.  A segment of the umbilical cord was obtained for cord gas analysis due to recent fetal tachycardia and deep variable decelerations with pushing. The  cord blood was sampled. IV Pitocin was initiated. Placenta delivered spontaneosly by uterine massage. The uterus was explored and evacuation of blood clots noted. Uterus noted to be firm. Good hemostasis noted. The perineum, vaginal mucosa and cervix was then examined.     The 2nd degree perineal laceration repaired with 2-0 rapide vicryl.  1,000 mcg cytotec was placed rectally for prophylaxis. FHT were tachycardic immediately proceeding delivery. During delivery and delivery of the placenta, patient did feel warm to me. Following delivery, an axillary temperature was obtained and was 101F. At this time, diagnosed with suspected II. Will give tylenol. Recent second dose of ampicillin was given, thus will give one more additional dose of gentamicin.     The patient was then moved to the supine position in stable condition.  Sponge and instrument counts were correct.    Complications:  None  QBL: 158mL     Mother and infant in good condition.    Ysamin Alfred MD   [facility] - OBGYN        Note to patient and family   The 21st Century Cures Act makes medical notes available to patients in the interest of transparency.  However, please be advised that this is a medical document.  It is intended as [id number] communication.  It is written and medical language may contain abbreviations or verbiage that are technical and unfamiliar.  It may appear blunt or direct.  Medical documents are intended to carry relevant information, facts as evident, and the clinical opinion of the practitioner.

## 2025-05-23 NOTE — ANESTHESIA PROCEDURE NOTES
Labor Analgesia    Date/Time: 5/23/2025 2:35 PM    Performed by: Edilberto Milton DO  Authorized by: Edilberto Milton DO      General Information and Staff    Start Time:  5/23/2025 2:35 PM  End Time:  5/23/2025 2:42 PM  Anesthesiologist:  Edilberto Milton DO  Performed by:  Anesthesiologist  Patient Location:  OB  Site Identification: surface landmarks    Reason for Block: labor epidural    Preanesthetic Checklist: patient identified, IV checked, risks and benefits discussed, monitors and equipment checked, pre-op evaluation, timeout performed, IV bolus, anesthesia consent and sterile technique used      Procedure Details    Patient Position:  Sitting  Prep: ChloraPrep    Monitoring:  Heart rate and continuous pulse ox  Approach:  Midline    Epidural Needle    Injection Technique:  SHAHLA saline  Needle Type:  Tuohy  Needle Gauge:  17 G  Needle Length:  3.375 in  Needle Insertion Depth:  7  Location:  L3-4    Spinal Needle      Catheter    Catheter Type:  End hole  Catheter Size:  19 G  Catheter at Skin Depth:  12  Test Dose:  Negative    Assessment      Additional Comments

## 2025-05-24 LAB
BASOPHILS # BLD AUTO: 0.05 X10(3) UL (ref 0–0.2)
BASOPHILS NFR BLD AUTO: 0.2 %
EOSINOPHIL # BLD AUTO: 0.06 X10(3) UL (ref 0–0.7)
EOSINOPHIL NFR BLD AUTO: 0.2 %
ERYTHROCYTE [DISTWIDTH] IN BLOOD BY AUTOMATED COUNT: 12.9 %
HCT VFR BLD AUTO: 34.4 % (ref 35–48)
HGB BLD-MCNC: 12.3 G/DL (ref 12–16)
IMM GRANULOCYTES # BLD AUTO: 0.18 X10(3) UL (ref 0–1)
IMM GRANULOCYTES NFR BLD: 0.7 %
LYMPHOCYTES # BLD AUTO: 3.27 X10(3) UL (ref 1–4)
LYMPHOCYTES NFR BLD AUTO: 13 %
MCH RBC QN AUTO: 32.4 PG (ref 26–34)
MCHC RBC AUTO-ENTMCNC: 35.8 G/DL (ref 31–37)
MCV RBC AUTO: 90.5 FL (ref 80–100)
MONOCYTES # BLD AUTO: 1.68 X10(3) UL (ref 0.1–1)
MONOCYTES NFR BLD AUTO: 6.7 %
NEUTROPHILS # BLD AUTO: 19.85 X10 (3) UL (ref 1.5–7.7)
NEUTROPHILS # BLD AUTO: 19.85 X10(3) UL (ref 1.5–7.7)
NEUTROPHILS NFR BLD AUTO: 79.2 %
PLATELET # BLD AUTO: 265 10(3)UL (ref 150–450)
RBC # BLD AUTO: 3.8 X10(6)UL (ref 3.8–5.3)
WBC # BLD AUTO: 25.1 X10(3) UL (ref 4–11)

## 2025-05-24 NOTE — PROGRESS NOTES
PPD#1    Patient has no complaints, lochia minimal    /81 (BP Location: Left arm)   Pulse 57   Temp 98.5 °F (36.9 °C) (Oral)   Resp 16   Ht 62.01\"   Wt 191 lb 12.8 oz (87 kg)   LMP 2024   SpO2 98%   Breastfeeding Yes   BMI 35.07 kg/m²     Recent Labs   Lab 25  1236 25  0738   RBC 4.22 3.80   HGB 13.4 12.3   HCT 38.1 34.4*   MCV 90.3 90.5   MCH 31.8 32.4   MCHC 35.2 35.8   RDW 12.9 12.9   NEPRELIM 4.03 19.85*   WBC 6.8 25.1*   .0 265.0       Abdomen: soft, NT/ND  Uterus: firm, below umbilicus    A/P: s/p   - doing well  -plan home tomorrow

## 2025-05-24 NOTE — PROGRESS NOTES
Labor Analgesia Follow Up Note    Patient underwent epidural anesthesia for labor analgesia,    Placenta Date/Time: 5/23/2025  5:30 PM    Delivery Date/Time:: 5/23/2025  5:24 PM    /69 (BP Location: Left arm)   Pulse 63   Temp 98.5 °F (36.9 °C) (Oral)   Resp 16   Ht 1.575 m (5' 2.01\")   Wt 87 kg (191 lb 12.8 oz)   LMP 08/19/2024   SpO2 98%   Breastfeeding Yes   BMI 35.07 kg/m²     Assessment:  Patient seen and no apparent anesthesia related complications.    Thank you for asking us to participate in the care of your patient.

## 2025-05-25 VITALS
OXYGEN SATURATION: 99 % | HEART RATE: 59 BPM | RESPIRATION RATE: 18 BRPM | SYSTOLIC BLOOD PRESSURE: 118 MMHG | HEIGHT: 62.01 IN | DIASTOLIC BLOOD PRESSURE: 67 MMHG | WEIGHT: 191.81 LBS | BODY MASS INDEX: 34.85 KG/M2 | TEMPERATURE: 98 F

## 2025-05-25 NOTE — PROGRESS NOTES
Patient discharged home in apparent good condition, with baby in carseat.Discharge instructions completed.

## 2025-05-27 ENCOUNTER — TELEPHONE (OUTPATIENT)
Dept: OBGYN UNIT | Facility: HOSPITAL | Age: 35
End: 2025-05-27

## 2025-05-27 ENCOUNTER — TELEPHONE (OUTPATIENT)
Facility: CLINIC | Age: 35
End: 2025-05-27

## 2025-05-27 NOTE — TELEPHONE ENCOUNTER
Breast Pump order was received from [facility].  Order form was placed in Dr. Yasmin Alfred's bin for signature.

## 2025-05-29 ENCOUNTER — PATIENT OUTREACH (OUTPATIENT)
Dept: CASE MANAGEMENT | Age: 35
End: 2025-05-29

## 2025-05-29 NOTE — PROGRESS NOTES
OBGYN Post Partum appointment request (delivered 05/23)    Dr Yasmin Alfred  [address] [place] [address] [place] [id number] [phone]  Follow up 6 weeks    Attempt #1:  Left message on voicemail for patient to call transitions specialist back to schedule follow up appointments. Provided Transitions specialist scheduling phone number [phone].

## 2025-05-30 NOTE — PROGRESS NOTES
Hospital follow-up appt / Discharged 5/25 [facility]      Delivering Clinician: MADELYN RASHEED Delivery Date:5/23/2025 Delivery Type: Normal spontaneous vaginal delivery          OBGYN Request :  Dr Madelyn Rasheed  [address] [place] [address] [place] [id number]  [phone]  Follow up 6 weeks          Attempt #2:  Left message on voicemail for patient to call transitions specialist back to schedule follow up appointments. Provided Transitions specialist scheduling phone number [phone].

## 2025-06-02 NOTE — PROGRESS NOTES
[facility] Post Partum appointment request (delivered 05/23)     Dr Yasmin Alfred  [address] [place] [address] [place] [id number] [phone]  Follow up 6 weeks  Multiple attempts w/no calls back; no appointment made    Attempt #3:  Left message on voicemail for patient to call transitions specialist back to schedule follow up appointments. Provided Transitions specialist scheduling phone number [phone]. Closing encounter. Will re-open if patient returns call.

## 2025-07-10 ENCOUNTER — POSTPARTUM (OUTPATIENT)
Facility: CLINIC | Age: 35
End: 2025-07-10
Payer: COMMERCIAL

## 2025-07-10 VITALS
DIASTOLIC BLOOD PRESSURE: 78 MMHG | SYSTOLIC BLOOD PRESSURE: 120 MMHG | BODY MASS INDEX: 32.46 KG/M2 | HEIGHT: 62 IN | WEIGHT: 176.38 LBS | HEART RATE: 80 BPM

## 2025-07-10 DIAGNOSIS — Z30.09 ENCOUNTER FOR GENERAL COUNSELING AND ADVICE ON CONTRACEPTIVE MANAGEMENT: ICD-10-CM

## 2025-07-10 PROBLEM — O99.210 OBESITY AFFECTING PREGNANCY, ANTEPARTUM (HCC): Status: RESOLVED | Noted: 2024-12-11 | Resolved: 2025-07-10

## 2025-07-10 PROBLEM — O41.1290 CHORIOAMNIONITIS (HCC): Status: RESOLVED | Noted: 2025-05-23 | Resolved: 2025-07-10

## 2025-07-10 PROBLEM — O42.90: Status: RESOLVED | Noted: 2025-05-23 | Resolved: 2025-07-10

## 2025-07-10 PROBLEM — Z34.90 PREGNANCY (HCC): Status: RESOLVED | Noted: 2025-05-23 | Resolved: 2025-07-10

## 2025-07-10 RX ORDER — NORETHINDRONE ACETATE AND ETHINYL ESTRADIOL 1MG-20(21)
1 KIT ORAL DAILY
Qty: 84 TABLET | Refills: 3 | Status: SHIPPED | OUTPATIENT
Start: 2025-07-10 | End: 2026-07-10

## 2025-07-10 NOTE — PROGRESS NOTES
[facility]  Obstetrics and Gynecology   History & Physical      Chief complaint:   Chief Complaint   Patient presents with    Postpartum Care     Delivered 25  baby girl, vaginal and formula feeding.       Subjective:     HPI: Santos Carr is a 34 year old  s/p  on  is presenting for post partum check.    - OB history complicated by:  AMA  Obesity  History of possible cHTN  ADHD  GBS+  Suspected II     Post partum course:  - Vaginal bleeding: stopped  - Lacerations/Incision: 2nd degree perineal laceration, feeling sore but no concerns for infection or disruption   - Diet: baseline  - BM: baseline  - Urinary: baseline   - Breastfeeding/bottlefeeding: formula feeding   - Baby: doing well   - Mood: now better, first two weeks were not so good but it has improved  - Return to sex?: no  - Birth control: would like to go back to OCP for now      ROS negative unless otherwise stated above    OB History  OB History    Para Term  AB Living   3 2 2 0 1 2   SAB IAB Ectopic Multiple Live Births   1 0 0 0 2     OB History    Para Term  AB Living   3 2 2 0 1 2   SAB IAB Ectopic Multiple Live Births   1 0 0 0 2      # Outcome Date GA Lbr Jerad/2nd Weight Sex Type Anes PTL Lv   3 Term 25 38w0d 03:25 / 00:29 6 lb 4.2 oz (2.84 kg) F NORMAL SPONT EPI N WINSTON      Complications: Fetal tachycardia, Variable decelerations   2 SAB  14w6d       FD   1 Term 20 39w3d 05:00 / 01:24 5 lb 15.2 oz (2.7 kg) M NORMAL SPONT EPI N WINSTON      Complications: Variable decelerations       Depression Scale Total: 0 (7/10/2025  9:46 AM)    PHQ-2 not done in last 12 months! Please administer and refresh!        Meds:  Medications Ordered Prior to Encounter[1]    PMH:  Past Medical History[2]    All:  Allergies[3]    PSH:  Past Surgical History[4]    Social History:  Short Social Hx on File[5]     Family History:  Family History[6]    Immunization History:  Immunization History    Administered Date(s) Administered    Influenza 11/24/2020    TDAP 07/26/2018, 03/21/2025         Objective:     Vitals:    07/10/25 0942   BP: 120/78   Pulse: 80   Weight: 176 lb 6.4 oz (80 kg)   Height: 62\"       Body mass index is 32.26 kg/m².    Physical Exam:     General: normal appearance  HEENT: normocephalic, no male pattern baldness, no acne  Respiratory: normal work of breathing, no extra use of accessory muscles  Cardiac: normal rate  MSK: normal range of motion  Neuro: normal movement, normal sensory  Skin: no abnormalities seen    Pelvic Exam:  - normal appearing vulva, perineum, anus  - normal appearing urethral meatus, urethra  - normal appearing vagina, well estrogenized with ruggae, physiologic discharge  - parous cervix without masses       Labs:  Lab Results   Component Value Date    WBC 25.1 (H) 05/24/2025    RBC 3.80 05/24/2025    HGB 12.3 05/24/2025    HCT 34.4 (L) 05/24/2025    MCV 90.5 05/24/2025    MCH 32.4 05/24/2025    MCHC 35.8 05/24/2025    RDW 12.9 05/24/2025    .0 05/24/2025        Lab Results   Component Value Date    GLU 75 05/23/2025    BUN 8 (L) 05/23/2025    BUNCREA 8.6 (L) 10/24/2020    CREATSERUM 0.73 05/23/2025    ANIONGAP 9 05/23/2025    GFR 60 07/07/2016    GFRNAA 123 10/24/2020    GFRAA 142 10/24/2020    CA 9.9 05/23/2025    OSMOCALC 283 05/23/2025    ALKPHO 267 (H) 05/23/2025    AST 35 (H) 05/23/2025    ALT 45 05/23/2025    BILT 0.7 05/23/2025    TP 7.2 05/23/2025    ALB 3.9 05/23/2025    GLOBULIN 3.3 05/23/2025     05/23/2025    K 4.5 05/23/2025     05/23/2025    CO2 20.0 (L) 05/23/2025       No results found for: \"CHOLEST\", \"TRIG\", \"HDL\", \"LDL\", \"VLDL\", \"TCHDLRATIO\", \"NONHDLC\", \"CHOLHDLRATIO\", \"CALCNONHDL\"     No results found for: \"T4F\", \"TSH\", \"TSHT4\", \"FSH\", \"LH\", \"PL\", \"AMH\"     Lab Results   Component Value Date    EAG 88 01/09/2025    A1C 4.7 01/09/2025         Imaging:  US OB Follow up for Specific Condition (per fetus) [45100]  Result Date:  2025  TRANSABDOMINAL ULTRASOUND HAVE BEEN PERFORMED SINGLE VIABLE IUP SEEN FHT =153 BPM PRESENTATION -CEPHALIC AMNIOTIC FLUID - 11.29 CM       Assessment:     Santos Carr is a 34 year old  s/p  on  is presenting for post partum check.    #post partum  - meeting goals of care  - plan follow up for annual well woman exam    #cervical cancer screen  - last pap smear: 2024 per patient normal  - next due: 2029 if HPV done,  if not    #Contraception counseling  - discussed with patient options for contraception including OCP, Nuvaring, Depo Provera, LARC (Nexplanon versus IUD), permanent sterilization  - risks, benefits and alternatives discussed   - pt expressed interest in OCP  - OCP contraindications: pt denies personal or family history of DVT/PE, not breastfeeding and <6 wks postpartum, liver issues, migraines with aura, smoking. She has history of possible cHTN however BP have all been normal. BP normal today. Discussed risks - will start and has follow up with PCP so will check BP then, if it seems to be increasing then would discontinue OCP and she is then considering tubal vs vasectomy     Return of care in 6 months - 1 year for well woman exam    Yasmin Alfred MD   [facility] - OBGYN    Note to patient and family:  The 21st Century Cures Act makes medical notes available to patients in the interest of transparency.  However, please be advised that this is a medical document.  It is intended as a peer to peer communication.  It is written in medical language and may contain abbreviations or verbiage that are technical and unfamiliar.  It may appear blunt or direct.  Medical documents are intended to carry relevant information, facts as evident, and the clinical opinion of the practitioner.         [1]   Current Outpatient Medications on File Prior to Visit   Medication Sig Dispense Refill    prenatal multivitamin plus DHA 27-0.8-228 MG Oral Cap Take 1 capsule by mouth in the  morning.       No current facility-administered medications on file prior to visit.   [2]   Past Medical History:   ADHD    Anemia    Asthma (HCC)    Cervical high risk HPV (human papillomavirus) test positive    CMV (cytomegalovirus) antibody positive    avidity test high therefore not a recent infection (not within the past 4 months)    History of lumpectomy of left breast    Pap smear for cervical cancer screening    Negative, HPV+    Screening for genetic disease carrier status    Carrier Screen = CARRIER for Cystic Fibrosis   [3] No Known Allergies  [4]   Past Surgical History:  Procedure Laterality Date    Appendectomy  1994    Breast surgery      D & c      Other Left 2006    breast cyst removal   [5]   Social History  Socioeconomic History    Marital status: Single   Tobacco Use    Smoking status: Former     Types: Cigarettes    Smokeless tobacco: Former   Vaping Use    Vaping status: Never Used   Substance and Sexual Activity    Alcohol use: Not Currently    Drug use: Never    Sexual activity: Not Currently     Partners: Male   Other Topics Concern    Caffeine Concern No    Exercise No    Seat Belt No    Special Diet No    Stress Concern No    Weight Concern No     Social Drivers of Health     Food Insecurity: No Food Insecurity (5/23/2025)    NCSS - Food Insecurity     Worried About Running Out of Food in the Last Year: No     Ran Out of Food in the Last Year: No   Transportation Needs: No Transportation Needs (5/23/2025)    NCSS - Transportation     Lack of Transportation: No   Stress: No Stress Concern Present (5/23/2025)    Stress     Feeling of Stress : No   Housing Stability: Not At Risk (5/23/2025)    NCSS - Housing/Utilities     Has Housing: Yes     Worried About Losing Housing: No     Unable to Get Utilities: No   [6]   Family History  Problem Relation Age of Onset    Hypertension Mother     Hypertension Father     Diabetes Father     Other (hypercholesterolemia) Father     Asthma Sister

## (undated) NOTE — LETTER
25    Re: Santos Carr  : 1990    To Whom It May Concern:  Santos Carr is under my care for pregnancy. Please limit walking throughout the facility. She is ok to sit and stand as needed, unless she feels uncomfortable, then please allow a break. Please make sure she does not lift more than 20 lbs.     If you have any questions concerning this letter, please feel free to contact my office.      Sincerely yours,    Yasmin Alfred MD

## (undated) NOTE — LETTER
May 31, 2017    Patient: Vida Hawkins   Date of Visit: 5/31/2017       To Whom It May Concern:    Vsih Johansen was seen and treated in our emergency department on 5/31/2017. She MAY RETURN TO WORK/SCHOOL ON 6/2/2017.     If you have any qu

## (undated) NOTE — LETTER
25    Re: Santos AMIN Falgunipricila  : 1990      To Whom It May Concern:  Santos Carr is under my care for pregnancy, if it is possible to be put on light duty.    If you have any questions concerning this letter, please feel free to contact my office.      Sincerely yours,    Nicky Hobbs, DO

## (undated) NOTE — ED AVS SNAPSHOT
[facility] Emergency Department    [facility]  [address] [address]    Phone:  [phone]    Fax:  [phone] [facility] [address]   MRN: [id number]    Department:  [facility] Emergency Department   Date of Visit: - Sulfamethoxazole-TMP -160 MG Tabs per tablet              Discharge Instructions       Clear liquids slowly advance her diet. Return if any severe abdominal pain, nausea or vomiting, fevers, chills.   You were seen in the emergency room in a limite a substitute for ongoing medical care. Often, one Emergency Department visit does not uncover every injury or illness.  If you have been referred to a primary care or a specialist physician for a follow-up visit, please tell this physician (or your personal [place] Valdivia (Santos Gentle) [phone] [address] [facility]. [address]) [phone]                Additional Information       We are concerned for your overall well being:    - If you are a smoker or have smoked in the las

## (undated) NOTE — LETTER
Date & Time: 6/4/2020, 5:08 AM  Patient: Dmitry Rai  Encounter Provider(s):    Alexys Barone MD       To Whom It May Concern:    Naomy Evangelista was seen and treated in our department on 6/4/2020. She may return to work on 6/06/2020.     If

## (undated) NOTE — ED AVS SNAPSHOT
[facility] Emergency Department    [facility]  [address] [address]    Phone:  [phone]    Fax:  [phone] [facility] Kristopher Hutchins   MRN: [id number]    Department:  [facility] Emergency Department   Date of Visit: IF THERE IS ANY CHANGE OR WORSENING OF YOUR CONDITION, CALL YOUR PRIMARY CARE PHYSICIAN AT ONCE OR RETURN IMMEDIATELY TO THE EMERGENCY DEPARTMENT.     If you have been prescribed any medication(s), please fill your prescription right away and begin taking t

## (undated) NOTE — LETTER
25    Re: Santos Carr  : 1990      To Whom It May Concern:  Santos Carr is under my care and has restrictions to *** until further notice.    If you have any questions concerning this letter, please feel free to contact my office.      Sincerely yours,    Yasmin Alfred MD